# Patient Record
Sex: FEMALE | Race: WHITE | NOT HISPANIC OR LATINO | Employment: OTHER | URBAN - METROPOLITAN AREA
[De-identification: names, ages, dates, MRNs, and addresses within clinical notes are randomized per-mention and may not be internally consistent; named-entity substitution may affect disease eponyms.]

---

## 2023-03-04 LAB
ALANINE AMINOTRANSFERASE (SGPT) (U/L) IN SER/PLAS: 24 U/L (ref 7–45)
ALBUMIN (G/DL) IN SER/PLAS: 3.8 G/DL (ref 3.4–5)
ALKALINE PHOSPHATASE (U/L) IN SER/PLAS: 104 U/L (ref 33–136)
ANION GAP IN SER/PLAS: 12 MMOL/L (ref 10–20)
ASPARTATE AMINOTRANSFERASE (SGOT) (U/L) IN SER/PLAS: 36 U/L (ref 9–39)
BILIRUBIN TOTAL (MG/DL) IN SER/PLAS: 0.6 MG/DL (ref 0–1.2)
CALCIDIOL (25 OH VITAMIN D3) (NG/ML) IN SER/PLAS: 40 NG/ML
CALCIUM (MG/DL) IN SER/PLAS: 9.5 MG/DL (ref 8.6–10.6)
CARBON DIOXIDE, TOTAL (MMOL/L) IN SER/PLAS: 25 MMOL/L (ref 21–32)
CHLORIDE (MMOL/L) IN SER/PLAS: 105 MMOL/L (ref 98–107)
CREATININE (MG/DL) IN SER/PLAS: 0.72 MG/DL (ref 0.5–1.05)
GFR FEMALE: 89 ML/MIN/1.73M2
GLUCOSE (MG/DL) IN SER/PLAS: 81 MG/DL (ref 74–99)
POTASSIUM (MMOL/L) IN SER/PLAS: 6 MMOL/L (ref 3.5–5.3)
PROTEIN TOTAL: 6.8 G/DL (ref 6.4–8.2)
SODIUM (MMOL/L) IN SER/PLAS: 136 MMOL/L (ref 136–145)
THYROTROPIN (MIU/L) IN SER/PLAS BY DETECTION LIMIT <= 0.05 MIU/L: 1.45 MIU/L (ref 0.44–3.98)
UREA NITROGEN (MG/DL) IN SER/PLAS: 19 MG/DL (ref 6–23)

## 2023-03-07 DIAGNOSIS — E87.5 HYPERKALEMIA: Primary | ICD-10-CM

## 2023-03-11 ENCOUNTER — LAB (OUTPATIENT)
Dept: LAB | Facility: LAB | Age: 73
End: 2023-03-11
Payer: MEDICARE

## 2023-03-11 DIAGNOSIS — E87.5 HYPERKALEMIA: ICD-10-CM

## 2023-03-11 LAB — POTASSIUM (MMOL/L) IN SER/PLAS: 4 MMOL/L (ref 3.5–5.3)

## 2023-03-11 PROCEDURE — 84132 ASSAY OF SERUM POTASSIUM: CPT

## 2023-03-11 PROCEDURE — 36415 COLL VENOUS BLD VENIPUNCTURE: CPT

## 2023-04-27 DIAGNOSIS — K21.00 GASTROESOPHAGEAL REFLUX DISEASE WITH ESOPHAGITIS WITHOUT HEMORRHAGE: Primary | ICD-10-CM

## 2023-04-27 PROBLEM — M41.9 SCOLIOSIS: Status: ACTIVE | Noted: 2023-04-27

## 2023-04-27 PROBLEM — R10.11 ABDOMINAL PAIN, RUQ (RIGHT UPPER QUADRANT): Status: RESOLVED | Noted: 2023-04-27 | Resolved: 2023-04-27

## 2023-04-27 PROBLEM — E66.9 OBESITY (BMI 35.0-39.9 WITHOUT COMORBIDITY): Status: ACTIVE | Noted: 2023-04-27

## 2023-04-27 PROBLEM — M70.60 GREATER TROCHANTERIC BURSITIS: Status: ACTIVE | Noted: 2023-04-27

## 2023-04-27 PROBLEM — R09.81 SINUS CONGESTION: Status: RESOLVED | Noted: 2023-04-27 | Resolved: 2023-04-27

## 2023-04-27 PROBLEM — R07.89 ATYPICAL CHEST PAIN: Status: ACTIVE | Noted: 2023-04-27

## 2023-04-27 PROBLEM — E55.9 VITAMIN D DEFICIENCY: Status: ACTIVE | Noted: 2023-04-27

## 2023-04-27 PROBLEM — I10 ELEVATED BLOOD PRESSURE READING IN OFFICE WITH DIAGNOSIS OF HYPERTENSION: Status: ACTIVE | Noted: 2023-04-27

## 2023-04-27 PROBLEM — I87.2 VENOUS INSUFFICIENCY (CHRONIC) (PERIPHERAL): Status: ACTIVE | Noted: 2023-04-27

## 2023-04-27 PROBLEM — M54.16 CHRONIC LUMBAR RADICULOPATHY: Status: ACTIVE | Noted: 2023-04-27

## 2023-04-27 PROBLEM — M25.511 RIGHT SHOULDER PAIN: Status: ACTIVE | Noted: 2023-04-27

## 2023-04-27 PROBLEM — M54.50 LOWER BACK PAIN: Status: ACTIVE | Noted: 2023-04-27

## 2023-04-27 PROBLEM — K29.00 ACUTE GASTRITIS WITHOUT HEMORRHAGE: Status: ACTIVE | Noted: 2023-04-27

## 2023-04-27 PROBLEM — E03.9 HYPOTHYROIDISM: Status: ACTIVE | Noted: 2023-04-27

## 2023-04-27 PROBLEM — R60.0 LEG EDEMA: Status: ACTIVE | Noted: 2023-04-27

## 2023-04-27 PROBLEM — M75.81 RIGHT ROTATOR CUFF TENDINITIS: Status: ACTIVE | Noted: 2023-04-27

## 2023-04-27 PROBLEM — K21.9 ESOPHAGEAL REFLUX: Status: ACTIVE | Noted: 2023-04-27

## 2023-04-27 PROBLEM — H81.10 BENIGN PAROXYSMAL POSITIONAL VERTIGO: Status: ACTIVE | Noted: 2023-04-27

## 2023-04-27 RX ORDER — ESOMEPRAZOLE MAGNESIUM 40 MG/1
40 CAPSULE, DELAYED RELEASE ORAL
COMMUNITY
Start: 2022-07-13 | End: 2023-04-27 | Stop reason: SDUPTHER

## 2023-04-27 RX ORDER — ESOMEPRAZOLE MAGNESIUM 40 MG/1
40 CAPSULE, DELAYED RELEASE ORAL
Qty: 90 CAPSULE | Refills: 0 | Status: SHIPPED | OUTPATIENT
Start: 2023-04-27 | End: 2023-08-01 | Stop reason: SDUPTHER

## 2023-05-08 DIAGNOSIS — E03.9 HYPOTHYROIDISM, UNSPECIFIED TYPE: Primary | ICD-10-CM

## 2023-05-08 RX ORDER — LEVOTHYROXINE SODIUM 150 UG/1
150 TABLET ORAL DAILY
Qty: 90 TABLET | Refills: 1 | Status: SHIPPED | OUTPATIENT
Start: 2023-05-08 | End: 2023-11-18

## 2023-05-08 RX ORDER — LEVOTHYROXINE SODIUM 150 UG/1
150 TABLET ORAL DAILY
COMMUNITY
End: 2023-05-08 | Stop reason: SDUPTHER

## 2023-08-01 DIAGNOSIS — K21.00 GASTROESOPHAGEAL REFLUX DISEASE WITH ESOPHAGITIS WITHOUT HEMORRHAGE: ICD-10-CM

## 2023-08-01 RX ORDER — ESOMEPRAZOLE MAGNESIUM 40 MG/1
40 CAPSULE, DELAYED RELEASE ORAL
Qty: 90 CAPSULE | Refills: 0 | Status: SHIPPED | OUTPATIENT
Start: 2023-08-01 | End: 2023-08-02 | Stop reason: SDUPTHER

## 2023-08-02 DIAGNOSIS — K21.00 GASTROESOPHAGEAL REFLUX DISEASE WITH ESOPHAGITIS WITHOUT HEMORRHAGE: ICD-10-CM

## 2023-08-02 RX ORDER — ESOMEPRAZOLE MAGNESIUM 40 MG/1
40 CAPSULE, DELAYED RELEASE ORAL
Qty: 90 CAPSULE | Refills: 0 | Status: SHIPPED | OUTPATIENT
Start: 2023-08-02 | End: 2023-08-31 | Stop reason: ALTCHOICE

## 2023-08-17 ENCOUNTER — APPOINTMENT (OUTPATIENT)
Dept: PRIMARY CARE | Facility: CLINIC | Age: 73
End: 2023-08-17
Payer: MEDICARE

## 2023-08-17 NOTE — PROGRESS NOTES
Subjective   Patient ID: Suri Wray is a 73 y.o. female who presents for No chief complaint on file..  HPI    Review of Systems    Objective   There were no vitals taken for this visit.    Physical Exam      Assessment/Plan   Problem List Items Addressed This Visit    None

## 2023-08-31 ENCOUNTER — LAB (OUTPATIENT)
Dept: LAB | Facility: LAB | Age: 73
End: 2023-08-31
Payer: MEDICARE

## 2023-08-31 ENCOUNTER — OFFICE VISIT (OUTPATIENT)
Dept: PRIMARY CARE | Facility: CLINIC | Age: 73
End: 2023-08-31
Payer: MEDICARE

## 2023-08-31 VITALS
HEART RATE: 77 BPM | WEIGHT: 178.6 LBS | DIASTOLIC BLOOD PRESSURE: 82 MMHG | OXYGEN SATURATION: 99 % | SYSTOLIC BLOOD PRESSURE: 134 MMHG | BODY MASS INDEX: 36.07 KG/M2 | TEMPERATURE: 97.9 F

## 2023-08-31 DIAGNOSIS — R63.4 WEIGHT LOSS: ICD-10-CM

## 2023-08-31 DIAGNOSIS — E66.01 OBESITY, MORBID (MULTI): ICD-10-CM

## 2023-08-31 DIAGNOSIS — R10.13 EPIGASTRIC PAIN: Primary | ICD-10-CM

## 2023-08-31 DIAGNOSIS — R10.13 EPIGASTRIC PAIN: ICD-10-CM

## 2023-08-31 DIAGNOSIS — E66.9 CLASS 2 OBESITY WITHOUT SERIOUS COMORBIDITY WITH BODY MASS INDEX (BMI) OF 36.0 TO 36.9 IN ADULT, UNSPECIFIED OBESITY TYPE: ICD-10-CM

## 2023-08-31 LAB
ALANINE AMINOTRANSFERASE (SGPT) (U/L) IN SER/PLAS: 12 U/L (ref 7–45)
ALBUMIN (G/DL) IN SER/PLAS: 3.8 G/DL (ref 3.4–5)
ALKALINE PHOSPHATASE (U/L) IN SER/PLAS: 101 U/L (ref 33–136)
ANION GAP IN SER/PLAS: 12 MMOL/L (ref 10–20)
ASPARTATE AMINOTRANSFERASE (SGOT) (U/L) IN SER/PLAS: 15 U/L (ref 9–39)
BILIRUBIN TOTAL (MG/DL) IN SER/PLAS: 0.3 MG/DL (ref 0–1.2)
CALCIUM (MG/DL) IN SER/PLAS: 9.6 MG/DL (ref 8.6–10.6)
CARBON DIOXIDE, TOTAL (MMOL/L) IN SER/PLAS: 26 MMOL/L (ref 21–32)
CHLORIDE (MMOL/L) IN SER/PLAS: 104 MMOL/L (ref 98–107)
CREATININE (MG/DL) IN SER/PLAS: 0.84 MG/DL (ref 0.5–1.05)
ERYTHROCYTE DISTRIBUTION WIDTH (RATIO) BY AUTOMATED COUNT: 13.7 % (ref 11.5–14.5)
ERYTHROCYTE MEAN CORPUSCULAR HEMOGLOBIN CONCENTRATION (G/DL) BY AUTOMATED: 31.4 G/DL (ref 32–36)
ERYTHROCYTE MEAN CORPUSCULAR VOLUME (FL) BY AUTOMATED COUNT: 97 FL (ref 80–100)
ERYTHROCYTES (10*6/UL) IN BLOOD BY AUTOMATED COUNT: 4.16 X10E12/L (ref 4–5.2)
GFR FEMALE: 74 ML/MIN/1.73M2
GLUCOSE (MG/DL) IN SER/PLAS: 115 MG/DL (ref 74–99)
HEMATOCRIT (%) IN BLOOD BY AUTOMATED COUNT: 40.5 % (ref 36–46)
HEMOGLOBIN (G/DL) IN BLOOD: 12.7 G/DL (ref 12–16)
LEUKOCYTES (10*3/UL) IN BLOOD BY AUTOMATED COUNT: 9.5 X10E9/L (ref 4.4–11.3)
NRBC (PER 100 WBCS) BY AUTOMATED COUNT: 0 /100 WBC (ref 0–0)
PLATELETS (10*3/UL) IN BLOOD AUTOMATED COUNT: 347 X10E9/L (ref 150–450)
POTASSIUM (MMOL/L) IN SER/PLAS: 4.7 MMOL/L (ref 3.5–5.3)
PROTEIN TOTAL: 6.8 G/DL (ref 6.4–8.2)
SODIUM (MMOL/L) IN SER/PLAS: 137 MMOL/L (ref 136–145)
THYROTROPIN (MIU/L) IN SER/PLAS BY DETECTION LIMIT <= 0.05 MIU/L: 0.1 MIU/L (ref 0.44–3.98)
THYROXINE (T4) FREE (NG/DL) IN SER/PLAS: 1.69 NG/DL (ref 0.78–1.48)
UREA NITROGEN (MG/DL) IN SER/PLAS: 18 MG/DL (ref 6–23)

## 2023-08-31 PROCEDURE — 1036F TOBACCO NON-USER: CPT | Performed by: FAMILY MEDICINE

## 2023-08-31 PROCEDURE — 36415 COLL VENOUS BLD VENIPUNCTURE: CPT

## 2023-08-31 PROCEDURE — 85027 COMPLETE CBC AUTOMATED: CPT

## 2023-08-31 PROCEDURE — 3008F BODY MASS INDEX DOCD: CPT | Performed by: FAMILY MEDICINE

## 2023-08-31 PROCEDURE — 3075F SYST BP GE 130 - 139MM HG: CPT | Performed by: FAMILY MEDICINE

## 2023-08-31 PROCEDURE — 3079F DIAST BP 80-89 MM HG: CPT | Performed by: FAMILY MEDICINE

## 2023-08-31 PROCEDURE — 84443 ASSAY THYROID STIM HORMONE: CPT

## 2023-08-31 PROCEDURE — 1160F RVW MEDS BY RX/DR IN RCRD: CPT | Performed by: FAMILY MEDICINE

## 2023-08-31 PROCEDURE — 99213 OFFICE O/P EST LOW 20 MIN: CPT | Performed by: FAMILY MEDICINE

## 2023-08-31 PROCEDURE — 1126F AMNT PAIN NOTED NONE PRSNT: CPT | Performed by: FAMILY MEDICINE

## 2023-08-31 PROCEDURE — 84439 ASSAY OF FREE THYROXINE: CPT

## 2023-08-31 PROCEDURE — 80053 COMPREHEN METABOLIC PANEL: CPT

## 2023-08-31 PROCEDURE — 1159F MED LIST DOCD IN RCRD: CPT | Performed by: FAMILY MEDICINE

## 2023-08-31 RX ORDER — DICYCLOMINE HYDROCHLORIDE 20 MG/1
TABLET ORAL
COMMUNITY
Start: 2023-04-01 | End: 2023-10-17 | Stop reason: SDUPTHER

## 2023-08-31 RX ORDER — OMEPRAZOLE 40 MG/1
40 CAPSULE, DELAYED RELEASE ORAL DAILY
COMMUNITY
End: 2023-10-04 | Stop reason: ALTCHOICE

## 2023-08-31 ASSESSMENT — PATIENT HEALTH QUESTIONNAIRE - PHQ9
SUM OF ALL RESPONSES TO PHQ9 QUESTIONS 1 AND 2: 0
1. LITTLE INTEREST OR PLEASURE IN DOING THINGS: NOT AT ALL
2. FEELING DOWN, DEPRESSED OR HOPELESS: NOT AT ALL

## 2023-08-31 NOTE — PROGRESS NOTES
Subjective   Patient ID: Suri Wray is a 73 y.o. female who presents for Abdominal Pain (Been happening since April 2023 /Urgent care April 1st - was prescribed Dicyclomine X7 days/Was sent for US on May 8th of abdominal).  Patient has been dealing with epigastric and RUQ pain that started last year.  GE endoscopy last year, March 2022 showed:  Impression:          - Low-grade of narrowing Schatzki ring. Dilated.                       - Normal mucosa was found in the entire esophagus.                       - Large hiatal hernia.                       - Erythematous mucosa in the gastric body and antrum.                       Biopsied.                       - Normal examined duodenum.  Pain began again this year.  Saw GE CNP at [facility]  Advised nexium  40 mg at bedtime, and pepcid as needed.  RUQ ultrasound was normal.  Has also had Dicyclomine  Pain in AM when  wakes, and after eating.  Is able to eat oatmeal without pain.    Eating less since having the problems.  Losing  weight, but wants to  make sure it is not from some other reason.  Had mammogram and pap recently.  Colonoscopy done in 2020, to be repeated in 2025.         Review of Systems    Objective   /82 (BP Location: Left arm, Patient Position: Sitting)   Pulse 77   Temp 36.6 °C (97.9 °F) (Oral)   Wt 81 kg (178 lb 9.6 oz)   SpO2 99%   BMI 36.07 kg/m²     Physical Exam  Vitals reviewed.   Constitutional:       Appearance: Normal appearance.   Cardiovascular:      Rate and Rhythm: Normal rate and regular rhythm.      Heart sounds: No murmur heard.  Pulmonary:      Effort: Pulmonary effort is normal.      Breath sounds: Normal breath sounds.   Abdominal:      General: There is no distension.      Palpations: Abdomen is soft. There is no mass.      Tenderness: There is abdominal tenderness (mild RUQ).   Musculoskeletal:      Right lower leg: No edema.      Left lower leg: No edema.   Neurological:      Mental Status: She is alert.            Assessment/Plan   Problem List Items Addressed This Visit       Obesity, morbid (CMS/HCC)     Other Visit Diagnoses       Epigastric pain    -  Primary    Relevant Orders    Comprehensive Metabolic Panel    CBC    Weight loss        Relevant Orders    TSH with reflex to Free T4 if abnormal    Class 2 obesity without serious comorbidity with body mass index (BMI) of 36.0 to 36.9 in adult, unspecified obesity type

## 2023-08-31 NOTE — PATIENT INSTRUCTIONS
Epigastric and right upper abdominal pain, with history of gastritis, hiatal hernia, and mild schatzki's ring.  Continue Nexium 40 mg at night, and pepcid as well.  Can use dicyclomine as needed.  Will check non-fasting blood work.  Follow up with the gastroenterologist, since symptoms have persisted.

## 2023-09-01 DIAGNOSIS — E03.9 HYPOTHYROIDISM, UNSPECIFIED TYPE: ICD-10-CM

## 2023-10-03 DIAGNOSIS — K21.9 GASTROESOPHAGEAL REFLUX DISEASE, UNSPECIFIED WHETHER ESOPHAGITIS PRESENT: Primary | ICD-10-CM

## 2023-10-04 RX ORDER — ESOMEPRAZOLE MAGNESIUM 40 MG/1
40 CAPSULE, DELAYED RELEASE ORAL
Qty: 90 CAPSULE | Refills: 1 | Status: SHIPPED | OUTPATIENT
Start: 2023-10-04 | End: 2023-10-30 | Stop reason: SDUPTHER

## 2023-10-17 DIAGNOSIS — R10.9 ABDOMINAL PAIN, UNSPECIFIED ABDOMINAL LOCATION: Primary | ICD-10-CM

## 2023-10-17 RX ORDER — DICYCLOMINE HYDROCHLORIDE 20 MG/1
TABLET ORAL
Qty: 60 TABLET | Refills: 0 | Status: SHIPPED | OUTPATIENT
Start: 2023-10-17 | End: 2023-10-25

## 2023-10-18 DIAGNOSIS — E03.9 HYPOTHYROIDISM, UNSPECIFIED TYPE: Primary | ICD-10-CM

## 2023-10-23 ENCOUNTER — LAB (OUTPATIENT)
Dept: LAB | Facility: LAB | Age: 73
End: 2023-10-23
Payer: MEDICARE

## 2023-10-23 DIAGNOSIS — E03.9 HYPOTHYROIDISM, UNSPECIFIED TYPE: ICD-10-CM

## 2023-10-23 LAB — TSH SERPL-ACNC: 0.71 MIU/L (ref 0.44–3.98)

## 2023-10-23 PROCEDURE — 36415 COLL VENOUS BLD VENIPUNCTURE: CPT

## 2023-10-23 PROCEDURE — 84443 ASSAY THYROID STIM HORMONE: CPT

## 2023-10-25 DIAGNOSIS — R10.9 ABDOMINAL PAIN, UNSPECIFIED ABDOMINAL LOCATION: ICD-10-CM

## 2023-10-25 RX ORDER — DICYCLOMINE HYDROCHLORIDE 20 MG/1
TABLET ORAL
Qty: 360 TABLET | Refills: 0 | Status: SHIPPED | OUTPATIENT
Start: 2023-10-25 | End: 2024-02-07 | Stop reason: SDUPTHER

## 2023-10-30 ENCOUNTER — TELEPHONE (OUTPATIENT)
Dept: PRIMARY CARE | Facility: CLINIC | Age: 73
End: 2023-10-30
Payer: MEDICARE

## 2023-10-30 DIAGNOSIS — K21.9 GASTROESOPHAGEAL REFLUX DISEASE, UNSPECIFIED WHETHER ESOPHAGITIS PRESENT: ICD-10-CM

## 2023-10-30 RX ORDER — ESOMEPRAZOLE MAGNESIUM 40 MG/1
40 CAPSULE, DELAYED RELEASE ORAL
Qty: 90 CAPSULE | Refills: 1 | Status: SHIPPED | OUTPATIENT
Start: 2023-10-30 | End: 2024-02-06 | Stop reason: SDUPTHER

## 2023-11-18 DIAGNOSIS — E03.9 HYPOTHYROIDISM, UNSPECIFIED TYPE: ICD-10-CM

## 2023-11-18 RX ORDER — LEVOTHYROXINE SODIUM 150 UG/1
150 TABLET ORAL DAILY
Qty: 90 TABLET | Refills: 1 | Status: SHIPPED | OUTPATIENT
Start: 2023-11-18 | End: 2024-05-16

## 2024-01-02 ENCOUNTER — APPOINTMENT (OUTPATIENT)
Dept: GASTROENTEROLOGY | Facility: CLINIC | Age: 74
End: 2024-01-02
Payer: MEDICARE

## 2024-02-06 ENCOUNTER — OFFICE VISIT (OUTPATIENT)
Dept: GASTROENTEROLOGY | Facility: CLINIC | Age: 74
End: 2024-02-06
Payer: MEDICARE

## 2024-02-06 VITALS
WEIGHT: 174 LBS | TEMPERATURE: 97.5 F | SYSTOLIC BLOOD PRESSURE: 169 MMHG | HEIGHT: 59 IN | DIASTOLIC BLOOD PRESSURE: 93 MMHG | BODY MASS INDEX: 35.08 KG/M2 | HEART RATE: 85 BPM

## 2024-02-06 DIAGNOSIS — K21.9 GASTROESOPHAGEAL REFLUX DISEASE WITHOUT ESOPHAGITIS: Primary | ICD-10-CM

## 2024-02-06 DIAGNOSIS — K21.9 GASTROESOPHAGEAL REFLUX DISEASE, UNSPECIFIED WHETHER ESOPHAGITIS PRESENT: ICD-10-CM

## 2024-02-06 PROCEDURE — 3080F DIAST BP >= 90 MM HG: CPT | Performed by: INTERNAL MEDICINE

## 2024-02-06 PROCEDURE — 1159F MED LIST DOCD IN RCRD: CPT | Performed by: INTERNAL MEDICINE

## 2024-02-06 PROCEDURE — 99203 OFFICE O/P NEW LOW 30 MIN: CPT | Performed by: INTERNAL MEDICINE

## 2024-02-06 PROCEDURE — 3077F SYST BP >= 140 MM HG: CPT | Performed by: INTERNAL MEDICINE

## 2024-02-06 PROCEDURE — 1126F AMNT PAIN NOTED NONE PRSNT: CPT | Performed by: INTERNAL MEDICINE

## 2024-02-06 PROCEDURE — 3008F BODY MASS INDEX DOCD: CPT | Performed by: INTERNAL MEDICINE

## 2024-02-06 PROCEDURE — 1036F TOBACCO NON-USER: CPT | Performed by: INTERNAL MEDICINE

## 2024-02-06 PROCEDURE — 99213 OFFICE O/P EST LOW 20 MIN: CPT | Mod: 25 | Performed by: INTERNAL MEDICINE

## 2024-02-06 RX ORDER — ESOMEPRAZOLE MAGNESIUM 40 MG/1
40 CAPSULE, DELAYED RELEASE ORAL
Qty: 90 CAPSULE | Refills: 3 | Status: SHIPPED | OUTPATIENT
Start: 2024-02-06 | End: 2025-02-05

## 2024-02-06 NOTE — PROGRESS NOTES
"Subjective   Patient ID: Suri Wray is a 73 y.o. female who presents for New Patient Visit.  She is a new patient to discuss reflux and abdominal pain in the epigastrium that radiates to the right side     Known to have hiatal hernia and esophageal ring dilated in 2022 by Dr. Lemons   She had her colonoscopy updated the same time  Recently broke through Nexium 20 mg per day and increased to 40 mg per day    US done last year May 2023 at [facility] showed normal liver, cholecystectomy and narrow bile duct at 4 mm  Pancreas obscured by bowel gas    Pain treated with dicyclomine and it \"helps a lot\"    Weigh is down due to eating at home   Good appetite   Bowel movements \"ok\" but may be lactose intolerant   Eating oatmeal every morning    GB removed 33 years ago due to many stones   Not sure if symptoms are similar           Review of Systems    Objective   Physical Exam  Constitutional:       Appearance: Normal appearance. She is obese.   Pulmonary:      Effort: Pulmonary effort is normal.   Abdominal:      General: There is no distension.      Palpations: Abdomen is soft. There is no mass.      Tenderness: There is no abdominal tenderness. There is no guarding.      Hernia: No hernia is present.   Neurological:      Mental Status: She is alert.         Assessment/Plan   Problem List Items Addressed This Visit             ICD-10-CM    Esophageal reflux - Primary K21.9            Tho Macias, DO 02/06/24 11:40 AM   "

## 2024-02-06 NOTE — PATIENT INSTRUCTIONS
I recommend you continue the Nexium once a day on an empty stomach.  The lower dose may be fine but don't worry about using 40 mg.      There is a slight risk of stomach cancer due to the prior changes on Dr. Lemons's biopsies (intestinal metaplasia) so having an endoscopy again in 2025 - 2027 would be reasonable.

## 2024-02-07 DIAGNOSIS — R10.9 ABDOMINAL PAIN, UNSPECIFIED ABDOMINAL LOCATION: ICD-10-CM

## 2024-02-07 RX ORDER — DICYCLOMINE HYDROCHLORIDE 20 MG/1
20 TABLET ORAL 4 TIMES DAILY PRN
Qty: 120 TABLET | Refills: 11 | Status: SHIPPED | OUTPATIENT
Start: 2024-02-07 | End: 2025-02-06

## 2024-02-28 ENCOUNTER — OFFICE VISIT (OUTPATIENT)
Dept: PRIMARY CARE | Facility: CLINIC | Age: 74
End: 2024-02-28
Payer: MEDICARE

## 2024-02-28 VITALS
TEMPERATURE: 97.8 F | OXYGEN SATURATION: 98 % | HEIGHT: 59 IN | SYSTOLIC BLOOD PRESSURE: 159 MMHG | BODY MASS INDEX: 35.44 KG/M2 | WEIGHT: 175.8 LBS | DIASTOLIC BLOOD PRESSURE: 82 MMHG | HEART RATE: 66 BPM

## 2024-02-28 DIAGNOSIS — E78.00 ELEVATED CHOLESTEROL: ICD-10-CM

## 2024-02-28 DIAGNOSIS — E66.9 OBESITY WITH BODY MASS INDEX (BMI) OF 35.0 TO 39.9 WITHOUT COMORBIDITY: ICD-10-CM

## 2024-02-28 DIAGNOSIS — R10.13 EPIGASTRIC PAIN: Primary | ICD-10-CM

## 2024-02-28 DIAGNOSIS — E03.9 HYPOTHYROIDISM, UNSPECIFIED TYPE: ICD-10-CM

## 2024-02-28 PROCEDURE — 1160F RVW MEDS BY RX/DR IN RCRD: CPT | Performed by: FAMILY MEDICINE

## 2024-02-28 PROCEDURE — 3077F SYST BP >= 140 MM HG: CPT | Performed by: FAMILY MEDICINE

## 2024-02-28 PROCEDURE — 3008F BODY MASS INDEX DOCD: CPT | Performed by: FAMILY MEDICINE

## 2024-02-28 PROCEDURE — 99213 OFFICE O/P EST LOW 20 MIN: CPT | Performed by: FAMILY MEDICINE

## 2024-02-28 PROCEDURE — 3079F DIAST BP 80-89 MM HG: CPT | Performed by: FAMILY MEDICINE

## 2024-02-28 PROCEDURE — 1126F AMNT PAIN NOTED NONE PRSNT: CPT | Performed by: FAMILY MEDICINE

## 2024-02-28 PROCEDURE — 1036F TOBACCO NON-USER: CPT | Performed by: FAMILY MEDICINE

## 2024-02-28 PROCEDURE — 1159F MED LIST DOCD IN RCRD: CPT | Performed by: FAMILY MEDICINE

## 2024-02-28 ASSESSMENT — ANXIETY QUESTIONNAIRES
4. TROUBLE RELAXING: NOT AT ALL
5. BEING SO RESTLESS THAT IT IS HARD TO SIT STILL: NOT AT ALL
1. FEELING NERVOUS, ANXIOUS, OR ON EDGE: NOT AT ALL
3. WORRYING TOO MUCH ABOUT DIFFERENT THINGS: NOT AT ALL
7. FEELING AFRAID AS IF SOMETHING AWFUL MIGHT HAPPEN: NOT AT ALL
6. BECOMING EASILY ANNOYED OR IRRITABLE: NOT AT ALL
GAD7 TOTAL SCORE: 1
2. NOT BEING ABLE TO STOP OR CONTROL WORRYING: SEVERAL DAYS
IF YOU CHECKED OFF ANY PROBLEMS ON THIS QUESTIONNAIRE, HOW DIFFICULT HAVE THESE PROBLEMS MADE IT FOR YOU TO DO YOUR WORK, TAKE CARE OF THINGS AT HOME, OR GET ALONG WITH OTHER PEOPLE: SOMEWHAT DIFFICULT

## 2024-02-28 ASSESSMENT — PATIENT HEALTH QUESTIONNAIRE - PHQ9
1. LITTLE INTEREST OR PLEASURE IN DOING THINGS: NOT AT ALL
2. FEELING DOWN, DEPRESSED OR HOPELESS: NOT AT ALL
SUM OF ALL RESPONSES TO PHQ9 QUESTIONS 1 AND 2: 0

## 2024-02-28 ASSESSMENT — ENCOUNTER SYMPTOMS
OCCASIONAL FEELINGS OF UNSTEADINESS: 0
LOSS OF SENSATION IN FEET: 0
DEPRESSION: 0

## 2024-02-28 NOTE — PROGRESS NOTES
"kSubjective   Patient ID: Suri Wray is a 73 y.o. female who presents for Abdominal Pain.  Pain on and  off in epigastrium, with radiation to the right.  Dicyclomine helps some.  Works best if takes prior to eating.  Gets diarrhea off and on, but hasn't changed.  May be related to lactose intolerance.    She saw Dr. Macias yesterday for the first time.   Previously had seen Dr. Lemons.  Copied from Dr. Macias note:  \"Known to have hiatal hernia and esophageal ring dilated in 2022 by Dr. Lemons   She had her colonoscopy updated the same time  Recently broke through Nexium 20 mg per day and increased to 40 mg per day   US done last year May 2023 at [facility] showed normal liver, cholecystectomy and narrow bile duct at 4 mm  Pancreas obscured by bowel gas\"     He had recommended continue nexium, and to follow up with Dr. Lemons with repeat EGD in 4379-2235.            Review of Systems    Objective   /82   Pulse 66   Temp 36.6 °C (97.8 °F)   Ht 1.499 m (4' 11\")   Wt 79.7 kg (175 lb 12.8 oz)   SpO2 98%   BMI 35.51 kg/m²     Physical Exam  Vitals reviewed.   Constitutional:       Appearance: Normal appearance.   Cardiovascular:      Rate and Rhythm: Normal rate and regular rhythm.   Pulmonary:      Effort: Pulmonary effort is normal.      Breath sounds: Normal breath sounds. Rales: epigastrium mainly.   Abdominal:      General: There is no distension.      Palpations: Abdomen is soft.      Tenderness: There is abdominal tenderness. There is no guarding or rebound.   Musculoskeletal:      Right lower leg: No edema.      Left lower leg: No edema.   Neurological:      Mental Status: She is alert.           Assessment/Plan   Problem List Items Addressed This Visit       Hypothyroidism    Relevant Orders    TSH with reflex to Free T4 if abnormal     Other Visit Diagnoses       Epigastric pain    -  Primary    Relevant Orders    Comprehensive Metabolic Panel    CBC    Lipase    Elevated cholesterol        " Relevant Orders    Lipid Panel    Obesity with body mass index (BMI) of 35.0 to 39.9 without comorbidity

## 2024-02-29 PROBLEM — E66.01 OBESITY, MORBID (MULTI): Status: RESOLVED | Noted: 2023-08-31 | Resolved: 2024-02-29

## 2024-02-29 NOTE — PATIENT INSTRUCTIONS
Increased epigastrium pain with radiation to the right upper quadrant.  Continue Nexium 40 mg in AM.  Refilled dicyclomine to use as needed for abdominal pain related to eating.  (Can take it prior to eating, to avoid getting the pain).  Check blood work for CBC, CMP.  Discussed following up with Dr. Lemons.  EGD done previously showed some metaplasia, which can be a precancerous change.  It should be rechecked in 8737-1536, but he may want to do it sooner, due to increased symptoms,    Will also check thyroid level and adjust dose of medication if needed.  Currently taking levothyroxine 150 mcg six days a week.

## 2024-03-04 ENCOUNTER — LAB (OUTPATIENT)
Dept: LAB | Facility: LAB | Age: 74
End: 2024-03-04
Payer: MEDICARE

## 2024-03-04 DIAGNOSIS — E78.00 ELEVATED CHOLESTEROL: ICD-10-CM

## 2024-03-04 DIAGNOSIS — R10.13 EPIGASTRIC PAIN: ICD-10-CM

## 2024-03-04 DIAGNOSIS — E03.9 HYPOTHYROIDISM, UNSPECIFIED TYPE: ICD-10-CM

## 2024-03-04 LAB
ALBUMIN SERPL BCP-MCNC: 3.7 G/DL (ref 3.4–5)
ALP SERPL-CCNC: 98 U/L (ref 33–136)
ALT SERPL W P-5'-P-CCNC: 13 U/L (ref 7–45)
ANION GAP SERPL CALC-SCNC: 13 MMOL/L (ref 10–20)
AST SERPL W P-5'-P-CCNC: 14 U/L (ref 9–39)
BILIRUB SERPL-MCNC: 0.4 MG/DL (ref 0–1.2)
BUN SERPL-MCNC: 17 MG/DL (ref 6–23)
CALCIUM SERPL-MCNC: 9.6 MG/DL (ref 8.6–10.6)
CHLORIDE SERPL-SCNC: 105 MMOL/L (ref 98–107)
CHOLEST SERPL-MCNC: 235 MG/DL (ref 0–199)
CHOLESTEROL/HDL RATIO: 4.6
CO2 SERPL-SCNC: 26 MMOL/L (ref 21–32)
CREAT SERPL-MCNC: 0.8 MG/DL (ref 0.5–1.05)
EGFRCR SERPLBLD CKD-EPI 2021: 78 ML/MIN/1.73M*2
ERYTHROCYTE [DISTWIDTH] IN BLOOD BY AUTOMATED COUNT: 13.5 % (ref 11.5–14.5)
GLUCOSE SERPL-MCNC: 84 MG/DL (ref 74–99)
HCT VFR BLD AUTO: 38.7 % (ref 36–46)
HDLC SERPL-MCNC: 51.3 MG/DL
HGB BLD-MCNC: 12.6 G/DL (ref 12–16)
LDLC SERPL CALC-MCNC: 143 MG/DL
LIPASE SERPL-CCNC: 14 U/L (ref 9–82)
MCH RBC QN AUTO: 30.6 PG (ref 26–34)
MCHC RBC AUTO-ENTMCNC: 32.6 G/DL (ref 32–36)
MCV RBC AUTO: 94 FL (ref 80–100)
NON HDL CHOLESTEROL: 184 MG/DL (ref 0–149)
NRBC BLD-RTO: 0 /100 WBCS (ref 0–0)
PLATELET # BLD AUTO: 329 X10*3/UL (ref 150–450)
POTASSIUM SERPL-SCNC: 4.5 MMOL/L (ref 3.5–5.3)
PROT SERPL-MCNC: 6.6 G/DL (ref 6.4–8.2)
RBC # BLD AUTO: 4.12 X10*6/UL (ref 4–5.2)
SODIUM SERPL-SCNC: 139 MMOL/L (ref 136–145)
TRIGL SERPL-MCNC: 204 MG/DL (ref 0–149)
TSH SERPL-ACNC: 0.93 MIU/L (ref 0.44–3.98)
VLDL: 41 MG/DL (ref 0–40)
WBC # BLD AUTO: 8.8 X10*3/UL (ref 4.4–11.3)

## 2024-03-04 PROCEDURE — 84443 ASSAY THYROID STIM HORMONE: CPT

## 2024-03-04 PROCEDURE — 80053 COMPREHEN METABOLIC PANEL: CPT

## 2024-03-04 PROCEDURE — 80061 LIPID PANEL: CPT

## 2024-03-04 PROCEDURE — 83690 ASSAY OF LIPASE: CPT

## 2024-03-04 PROCEDURE — 85027 COMPLETE CBC AUTOMATED: CPT

## 2024-03-04 PROCEDURE — 36415 COLL VENOUS BLD VENIPUNCTURE: CPT

## 2024-05-16 DIAGNOSIS — E03.9 HYPOTHYROIDISM, UNSPECIFIED TYPE: ICD-10-CM

## 2024-05-16 RX ORDER — LEVOTHYROXINE SODIUM 150 UG/1
150 TABLET ORAL DAILY
Qty: 90 TABLET | Refills: 1 | Status: SHIPPED | OUTPATIENT
Start: 2024-05-16

## 2024-06-27 ENCOUNTER — TELEPHONE (OUTPATIENT)
Dept: PRIMARY CARE | Facility: CLINIC | Age: 74
End: 2024-06-27
Payer: MEDICARE

## 2024-08-15 ENCOUNTER — OFFICE VISIT (OUTPATIENT)
Dept: PRIMARY CARE | Facility: CLINIC | Age: 74
End: 2024-08-15
Payer: MEDICARE

## 2024-08-15 VITALS
DIASTOLIC BLOOD PRESSURE: 68 MMHG | WEIGHT: 176.8 LBS | HEART RATE: 70 BPM | OXYGEN SATURATION: 98 % | SYSTOLIC BLOOD PRESSURE: 123 MMHG | HEIGHT: 59 IN | BODY MASS INDEX: 35.64 KG/M2

## 2024-08-15 DIAGNOSIS — M41.9 SCOLIOSIS OF THORACOLUMBAR SPINE, UNSPECIFIED SCOLIOSIS TYPE: ICD-10-CM

## 2024-08-15 DIAGNOSIS — M25.50 ARTHRALGIA, UNSPECIFIED JOINT: Primary | ICD-10-CM

## 2024-08-15 LAB — ERYTHROCYTE [SEDIMENTATION RATE] IN BLOOD BY WESTERGREN METHOD: 39 MM/H (ref 0–30)

## 2024-08-15 PROCEDURE — 99213 OFFICE O/P EST LOW 20 MIN: CPT | Performed by: FAMILY MEDICINE

## 2024-08-15 PROCEDURE — 85652 RBC SED RATE AUTOMATED: CPT

## 2024-08-15 PROCEDURE — 86038 ANTINUCLEAR ANTIBODIES: CPT

## 2024-08-15 PROCEDURE — 3008F BODY MASS INDEX DOCD: CPT | Performed by: FAMILY MEDICINE

## 2024-08-15 PROCEDURE — 1159F MED LIST DOCD IN RCRD: CPT | Performed by: FAMILY MEDICINE

## 2024-08-15 PROCEDURE — 1160F RVW MEDS BY RX/DR IN RCRD: CPT | Performed by: FAMILY MEDICINE

## 2024-08-15 PROCEDURE — 3074F SYST BP LT 130 MM HG: CPT | Performed by: FAMILY MEDICINE

## 2024-08-15 PROCEDURE — 3078F DIAST BP <80 MM HG: CPT | Performed by: FAMILY MEDICINE

## 2024-08-15 PROCEDURE — 86140 C-REACTIVE PROTEIN: CPT

## 2024-08-15 NOTE — PROGRESS NOTES
"Subjective   Patient ID: Suri Wray is a 74 y.o. female who presents for Joint Pain.  Patient here with concern and frustration about pain in multiple joints.  Has been treated for bursitis in hip and knee.  Did PT.  Neck pain, left side doing better.  Right  shoulder tendonitis.  Has had an injection, and going to have a second. Pain radiates to neck and down to fingers.  Is swimming to try and help, but can't use right arm for swimming.  Has scoliosis, is getting worse, ortho had recommended surgery.  Previously had left sciatica and had injections.  Last time was 2.5 years ago.  Wonders about fibromyalgia as a cause of her pain.  Her pain is more in joints, worse with rainy weather.        Review of Systems    Objective   /68   Pulse 70   Ht 1.499 m (4' 11\")   Wt 80.2 kg (176 lb 12.8 oz)   SpO2 98%   BMI 35.71 kg/m²     Physical Exam  Vitals reviewed.   Constitutional:       Appearance: Normal appearance.   Cardiovascular:      Rate and Rhythm: Normal rate and regular rhythm.      Heart sounds: No murmur heard.  Pulmonary:      Effort: Pulmonary effort is normal.      Breath sounds: Normal breath sounds.   Musculoskeletal:      Right lower leg: No edema.      Left lower leg: No edema.      Comments: Scoliosis of back involving thoracic and lumbar areas.  Tenderness right SI area.    Fibromyalgia trigger points not consistently tender.    Right shoulder with reduced ROM.  Tender anteriorly.       Neurological:      Mental Status: She is alert.           Assessment/Plan   Problem List Items Addressed This Visit       Scoliosis    Relevant Orders    XR thoracolumbar spine 2 views     Other Visit Diagnoses       Arthralgia, unspecified joint    -  Primary    Relevant Orders    Sedimentation Rate (Completed)    C-reactive protein (Completed)    JOE with Reflex to BERNARDA               "

## 2024-08-16 LAB — CRP SERPL-MCNC: 0.63 MG/DL

## 2024-08-16 NOTE — PATIENT INSTRUCTIONS
History of scoliosis, pain has been controlled to some extent with injections, PT.  Will check X-Ray to evaluate progression.  If worsening, or considering surgical intervention, will refer back to orthopedist.  (Previously seen by Dr. Ren and Dr. Garduno).    Other musculoskeletal pain from bursitis, tendonitis, arthritis, not really consistent with fibromyalgia, polymyalgia rheumatica, or auto-immune disorder.  Will check check JOE and inflammatory markers (ESR, CRP) to be thorough.   Continue with PT as needed, shoulder ortho, pain management.  Could consider medication such as gabapentin or duloxetine, which can sometimes be helpful with chronic pain.

## 2024-08-17 DIAGNOSIS — E03.9 HYPOTHYROIDISM, UNSPECIFIED TYPE: ICD-10-CM

## 2024-08-18 RX ORDER — LEVOTHYROXINE SODIUM 150 UG/1
150 TABLET ORAL DAILY
Qty: 90 TABLET | Refills: 1 | Status: SHIPPED | OUTPATIENT
Start: 2024-08-18

## 2024-08-21 LAB — ANA SER QL HEP2 SUBST: NEGATIVE

## 2024-08-26 ENCOUNTER — APPOINTMENT (OUTPATIENT)
Dept: PRIMARY CARE | Facility: CLINIC | Age: 74
End: 2024-08-26
Payer: MEDICARE

## 2024-09-05 ENCOUNTER — APPOINTMENT (OUTPATIENT)
Dept: PRIMARY CARE | Facility: CLINIC | Age: 74
End: 2024-09-05
Payer: MEDICARE

## 2024-10-19 ENCOUNTER — HOSPITAL ENCOUNTER (OUTPATIENT)
Dept: RADIOLOGY | Facility: CLINIC | Age: 74
Discharge: HOME | End: 2024-10-19
Payer: MEDICARE

## 2024-10-19 DIAGNOSIS — M41.9 SCOLIOSIS OF THORACOLUMBAR SPINE, UNSPECIFIED SCOLIOSIS TYPE: ICD-10-CM

## 2024-10-19 PROCEDURE — 72080 X-RAY EXAM THORACOLMB 2/> VW: CPT

## 2024-10-19 PROCEDURE — 72080 X-RAY EXAM THORACOLMB 2/> VW: CPT | Performed by: RADIOLOGY

## 2024-11-26 ENCOUNTER — HOSPITAL ENCOUNTER (OUTPATIENT)
Dept: RADIOLOGY | Facility: EXTERNAL LOCATION | Age: 74
Discharge: HOME | End: 2024-11-26

## 2024-11-26 ENCOUNTER — APPOINTMENT (OUTPATIENT)
Dept: PRIMARY CARE | Facility: CLINIC | Age: 74
End: 2024-11-26
Payer: MEDICARE

## 2024-11-26 VITALS
WEIGHT: 176.2 LBS | HEART RATE: 66 BPM | DIASTOLIC BLOOD PRESSURE: 76 MMHG | BODY MASS INDEX: 35.59 KG/M2 | OXYGEN SATURATION: 99 % | TEMPERATURE: 97.1 F | SYSTOLIC BLOOD PRESSURE: 126 MMHG

## 2024-11-26 DIAGNOSIS — Z79.899 LONG-TERM CURRENT USE OF PROTON PUMP INHIBITOR THERAPY: ICD-10-CM

## 2024-11-26 DIAGNOSIS — Z12.31 ENCOUNTER FOR SCREENING MAMMOGRAM FOR BREAST CANCER: ICD-10-CM

## 2024-11-26 DIAGNOSIS — R10.9 ABDOMINAL PAIN, UNSPECIFIED ABDOMINAL LOCATION: ICD-10-CM

## 2024-11-26 DIAGNOSIS — Z78.0 ASYMPTOMATIC MENOPAUSAL STATE: ICD-10-CM

## 2024-11-26 DIAGNOSIS — E66.812 CLASS 2 OBESITY WITHOUT SERIOUS COMORBIDITY WITH BODY MASS INDEX (BMI) OF 35.0 TO 35.9 IN ADULT, UNSPECIFIED OBESITY TYPE: ICD-10-CM

## 2024-11-26 DIAGNOSIS — K21.9 GASTROESOPHAGEAL REFLUX DISEASE WITHOUT ESOPHAGITIS: ICD-10-CM

## 2024-11-26 DIAGNOSIS — E03.9 HYPOTHYROIDISM, UNSPECIFIED TYPE: ICD-10-CM

## 2024-11-26 DIAGNOSIS — Z00.00 ROUTINE GENERAL MEDICAL EXAMINATION AT HEALTH CARE FACILITY: Primary | ICD-10-CM

## 2024-11-26 PROCEDURE — 1160F RVW MEDS BY RX/DR IN RCRD: CPT | Performed by: FAMILY MEDICINE

## 2024-11-26 PROCEDURE — G0439 PPPS, SUBSEQ VISIT: HCPCS | Performed by: FAMILY MEDICINE

## 2024-11-26 PROCEDURE — 3074F SYST BP LT 130 MM HG: CPT | Performed by: FAMILY MEDICINE

## 2024-11-26 PROCEDURE — 1126F AMNT PAIN NOTED NONE PRSNT: CPT | Performed by: FAMILY MEDICINE

## 2024-11-26 PROCEDURE — 3078F DIAST BP <80 MM HG: CPT | Performed by: FAMILY MEDICINE

## 2024-11-26 PROCEDURE — 1170F FXNL STATUS ASSESSED: CPT | Performed by: FAMILY MEDICINE

## 2024-11-26 PROCEDURE — 96372 THER/PROPH/DIAG INJ SC/IM: CPT | Performed by: FAMILY MEDICINE

## 2024-11-26 PROCEDURE — 1159F MED LIST DOCD IN RCRD: CPT | Performed by: FAMILY MEDICINE

## 2024-11-26 RX ORDER — CYANOCOBALAMIN 1000 UG/ML
1000 INJECTION, SOLUTION INTRAMUSCULAR; SUBCUTANEOUS ONCE
Status: COMPLETED | OUTPATIENT
Start: 2024-11-26 | End: 2024-11-26

## 2024-11-26 ASSESSMENT — ACTIVITIES OF DAILY LIVING (ADL)
DRESSING: INDEPENDENT
MANAGING_FINANCES: INDEPENDENT
BATHING: INDEPENDENT
GROCERY_SHOPPING: INDEPENDENT
BATHING: INDEPENDENT
DOING_HOUSEWORK: INDEPENDENT
GROCERY_SHOPPING: INDEPENDENT
MANAGING_FINANCES: INDEPENDENT
DRESSING: INDEPENDENT
DOING_HOUSEWORK: INDEPENDENT
TAKING_MEDICATION: INDEPENDENT
TAKING_MEDICATION: INDEPENDENT

## 2024-11-26 ASSESSMENT — PATIENT HEALTH QUESTIONNAIRE - PHQ9
2. FEELING DOWN, DEPRESSED OR HOPELESS: NOT AT ALL
1. LITTLE INTEREST OR PLEASURE IN DOING THINGS: NOT AT ALL
SUM OF ALL RESPONSES TO PHQ9 QUESTIONS 1 AND 2: 0

## 2024-11-26 ASSESSMENT — ENCOUNTER SYMPTOMS
OCCASIONAL FEELINGS OF UNSTEADINESS: 0
DEPRESSION: 0
LOSS OF SENSATION IN FEET: 0

## 2024-11-26 ASSESSMENT — PAIN SCALES - GENERAL: PAINLEVEL_OUTOF10: 0-NO PAIN

## 2024-11-26 NOTE — PROGRESS NOTES
Subjective   Reason for Visit: Suri Wray is an 74 y.o. female here for a Medicare Wellness visit.     Past Medical, Surgical, and Family History reviewed and updated in chart.         Will be seeing gastroenterologist CNP.  Last colonosopy 2014, so is due.  Would like to get EGD as well.  Has had chronic GERD, for which she take Nexium daily.  Uses dicyclomine as needed for post prandial epigastric pain.    Has been losing weight.  Watching what she is eating.    Exercises regularly.    Has a lot of joint pain.  Has chronic back pain that affects her activities.  Has scoliosis, saw spine specialist at , then [facility].    Has had injections for pain, which has helped some.  But there is still concern about progression of the scoliosis.  Pain was doing better when in [place] for 3 weeks.    Wonders about taking B12 injection to feel better, have more energy.        Patient Care Team:  Margy Mondragon MD as PCP - General (Family Medicine)  Margy Mondragon MD as PCP - [employer] PCP     Review of Systems    Objective   Vitals:  /76 (BP Location: Right arm, Patient Position: Sitting, BP Cuff Size: Large adult)   Pulse 66   Temp 36.2 °C (97.1 °F) (Temporal)   Wt 79.9 kg (176 lb 3.2 oz)   SpO2 99%   BMI 35.59 kg/m²       Physical Exam  Vitals reviewed.   Constitutional:       Appearance: Normal appearance.   Cardiovascular:      Rate and Rhythm: Normal rate and regular rhythm.      Heart sounds: No murmur heard.  Pulmonary:      Effort: Pulmonary effort is normal.      Breath sounds: Normal breath sounds.   Musculoskeletal:      Right lower leg: No edema.      Left lower leg: No edema.   Neurological:      Mental Status: She is alert.   Psychiatric:         Mood and Affect: Mood normal.         Behavior: Behavior normal.         Assessment & Plan  Long-term current use of proton pump inhibitor therapy    Orders:    cyanocobalamin (Vitamin B-12) injection 1,000 mcg    Routine general medical  examination at health care facility    Orders:    1 Year Follow Up In [facility] - PCP - Wellness Exam; Future    Asymptomatic menopausal state    Orders:    XR DEXA bone density; Future    Encounter for screening mammogram for breast cancer    Orders:    BI mammo bilateral screening tomosynthesis; Future    Gastroesophageal reflux disease without esophagitis    Orders:    esomeprazole (NexIUM) 40 mg DR capsule; Take 1 capsule (40 mg) by mouth once daily in the morning. Take before meals. Do not open capsule.    Abdominal pain, unspecified abdominal location    Orders:    dicyclomine (Bentyl) 20 mg tablet; Take 1 tablet (20 mg) by mouth 4 times a day as needed (cramps).    Hypothyroidism, unspecified type    Orders:    levothyroxine (Synthroid, Levoxyl) 150 mcg tablet; Take 1 tablet (150 mcg) by mouth once daily.    Class 2 obesity without serious comorbidity with body mass index (BMI) of 35.0 to 35.9 in adult, unspecified obesity type

## 2024-11-27 RX ORDER — LEVOTHYROXINE SODIUM 150 UG/1
150 TABLET ORAL DAILY
Qty: 90 TABLET | Refills: 1 | Status: SHIPPED | OUTPATIENT
Start: 2024-11-27

## 2024-11-27 RX ORDER — ESOMEPRAZOLE MAGNESIUM 40 MG/1
40 CAPSULE, DELAYED RELEASE ORAL
Qty: 90 CAPSULE | Refills: 1 | Status: SHIPPED | OUTPATIENT
Start: 2024-11-27 | End: 2025-05-26

## 2024-11-27 RX ORDER — DICYCLOMINE HYDROCHLORIDE 20 MG/1
20 TABLET ORAL 4 TIMES DAILY PRN
Qty: 120 TABLET | Refills: 1 | Status: SHIPPED | OUTPATIENT
Start: 2024-11-27 | End: 2025-11-27

## 2024-11-27 NOTE — PATIENT INSTRUCTIONS
Immunizations recommended:  --Flu shot every fall. -done  --Prevnar 20 once,  to reduce risk of pneumonia.  --Shingrix,to reduce risk of getting shingles.  It can be done at the pharmacy  --Tetanus every 10 years.  --Covid vaccine.  --RSV vaccine. -done    If your pap smears have been normal, you can do them every 3-5 years, and stop after the age of 65.    Colonoscopy should be done every 10 years after the age of 45, unless there was a previous abnormality, or family history of colon cancer.  Yours was done in 2014, so due this year. Will be seeing gastroenterologist    Mammogram screening recommendations are changing, but at this time, I recommend a mammogram every 1-2 years in your 40's, and yearly after the age of 50.  Having a family history of breast cancer may change that.  Will get at [facility].    You should try to get 150 minutes of exercise weekly.  Be sure to eat a diet rich in fruits and vegetables, and low in saturated fats and sodium.  Strive for a healthy BMI of less than 25.     Make sure to wear sun screen when outside, and check for changing or unusual moles.    Pre-menopause recommendations are for 1000 mg calcium and 1000 units vitamin D3 daily.  After menopause calcium recommendation is 1200 mg, with 1000 units of vitamin D3  Bone density can be done every 2 years to assess bone loss. -ordered    I recommend you get a Living Will and Durable Power of  for Healthcare. These documents state what care you would want if you couldn't speak for yourself. They help your loved ones in caring for you if that happens.   Once you have those forms completed, you can keep a copy on file with your doctor.    Specialists being seen:  Gastroenterologist    Epigastric and right upper abdominal pain, with history of gastritis, hiatal hernia, and mild schatzki's ring.  Continue Nexium 40 mg at night, and pepcid as well.  Can use dicyclomine as needed.  Will be seeing gastroenterologist.  Discussed pros and  cons of ongoing PPI medication (Nexium).    Vitamin B12 injection given today.  Can do up to monthly if it is helpful.  Should also check levels with next blood draw.    For thyroid, taking 150 mcg daily.

## 2024-11-27 NOTE — ASSESSMENT & PLAN NOTE
Orders:    levothyroxine (Synthroid, Levoxyl) 150 mcg tablet; Take 1 tablet (150 mcg) by mouth once daily.

## 2024-12-02 ENCOUNTER — TELEPHONE (OUTPATIENT)
Dept: PRIMARY CARE | Facility: CLINIC | Age: 74
End: 2024-12-02
Payer: MEDICARE

## 2024-12-02 NOTE — TELEPHONE ENCOUNTER
Suri needs order for Bone Density, she would like to be notified once order has been place so she can schedule

## 2025-01-03 DIAGNOSIS — K44.9 HIATAL HERNIA: Primary | ICD-10-CM

## 2025-01-09 ENCOUNTER — HOSPITAL ENCOUNTER (OUTPATIENT)
Dept: RADIOLOGY | Facility: CLINIC | Age: 75
Discharge: HOME | End: 2025-01-09
Payer: MEDICARE

## 2025-01-09 DIAGNOSIS — Z78.0 ASYMPTOMATIC MENOPAUSAL STATE: ICD-10-CM

## 2025-01-09 PROCEDURE — 77080 DXA BONE DENSITY AXIAL: CPT | Performed by: RADIOLOGY

## 2025-01-09 PROCEDURE — 77080 DXA BONE DENSITY AXIAL: CPT

## 2025-01-26 DIAGNOSIS — R42 VERTIGO: Primary | ICD-10-CM

## 2025-02-18 ENCOUNTER — OFFICE VISIT (OUTPATIENT)
Dept: SURGERY | Facility: CLINIC | Age: 75
End: 2025-02-18
Payer: MEDICARE

## 2025-02-18 VITALS
OXYGEN SATURATION: 98 % | HEART RATE: 71 BPM | DIASTOLIC BLOOD PRESSURE: 92 MMHG | TEMPERATURE: 98.6 F | BODY MASS INDEX: 33.57 KG/M2 | HEIGHT: 60 IN | SYSTOLIC BLOOD PRESSURE: 179 MMHG | WEIGHT: 171 LBS

## 2025-02-18 DIAGNOSIS — K44.9 HIATAL HERNIA: Primary | ICD-10-CM

## 2025-02-18 PROCEDURE — 1036F TOBACCO NON-USER: CPT | Performed by: SURGERY

## 2025-02-18 PROCEDURE — 3077F SYST BP >= 140 MM HG: CPT | Performed by: SURGERY

## 2025-02-18 PROCEDURE — 3008F BODY MASS INDEX DOCD: CPT | Performed by: SURGERY

## 2025-02-18 PROCEDURE — 1160F RVW MEDS BY RX/DR IN RCRD: CPT | Performed by: SURGERY

## 2025-02-18 PROCEDURE — 1159F MED LIST DOCD IN RCRD: CPT | Performed by: SURGERY

## 2025-02-18 PROCEDURE — 99214 OFFICE O/P EST MOD 30 MIN: CPT | Performed by: SURGERY

## 2025-02-18 PROCEDURE — 3080F DIAST BP >= 90 MM HG: CPT | Performed by: SURGERY

## 2025-02-18 PROCEDURE — 99204 OFFICE O/P NEW MOD 45 MIN: CPT | Performed by: SURGERY

## 2025-02-18 NOTE — PROGRESS NOTES
History Of Present Illness  Suri Wray is a 74 y.o. female presenting   with increasingly symptomatic large hiatal hernia.  She has noticed noticed to have this hernia several years ago.  She is now having postprandial chest pain.  She notices that she is getting more short of breath at times.  She has very little dysphagia.  She had a recent CT scan and upper endoscopy that showed this.  I do not have access to those films we will ask for them to be sent over.  She had a previous open gallbladder surgery 35 years ago.  She is no longer working.  She previously ran an art gallery.  No history of heart disease.  She has had a recent colonoscopy.        Last Recorded Vitals  Blood pressure (!) 179/92, pulse 71, temperature 37 °C (98.6 °F), height 1.524 m (5'), weight 77.6 kg (171 lb), SpO2 98%.  Physical Examination  Awake and alert.  Normal respiration.  Abdomen she has a right upper quadrant scar.  No evidence of any hernias.    Relevant Results  I reviewed her recent results from the [facility].  I discussed with her that I will ask for the films to be sent over.  Assessment/Plan   Patient with very large hiatal hernia.  I will obtain a barium swallow to assess for esophageal length.  There is some discussion at the [facility] of having a esophageal lengthening procedure.  Her biggest concern is that she had some metaplasia on one of her biopsies.  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  I did discuss with her both medical and surgical management if they are both successful managing her symptoms are equal and decreasing this risk for conversion to esophageal cancer.  As discussed with her age this should not be a concern with her.  I did discuss that she does have a true foreshortened esophagus that would change the complexity of the operation.  Presently she would like to wait till June for operation for her granddaughter graduating from college.  I feel  there is any problems with that.  I will review her barium swallow and look at her CT scan before we decide on surgery versus.  Continued medical management.      Asif Hooks MD FACS  Professor of Surgery  Chantel Wen Chair in Surgical [place]  [facility] [employer]  [id number] [place] [address], [id number]  Phone [phone]  email: [email or web address]

## 2025-02-19 ENCOUNTER — HOSPITAL ENCOUNTER (OUTPATIENT)
Facility: HOSPITAL | Age: 75
Setting detail: OUTPATIENT SURGERY
End: 2025-02-19
Attending: SURGERY | Admitting: SURGERY
Payer: MEDICARE

## 2025-02-19 DIAGNOSIS — K44.9 HIATAL HERNIA: Primary | ICD-10-CM

## 2025-02-19 DIAGNOSIS — G89.18 ACUTE POST-OPERATIVE PAIN: ICD-10-CM

## 2025-02-22 DIAGNOSIS — R10.9 ABDOMINAL PAIN, UNSPECIFIED ABDOMINAL LOCATION: ICD-10-CM

## 2025-02-24 RX ORDER — DICYCLOMINE HYDROCHLORIDE 20 MG/1
20 TABLET ORAL 4 TIMES DAILY PRN
Qty: 360 TABLET | Refills: 3 | Status: SHIPPED | OUTPATIENT
Start: 2025-02-24 | End: 2026-02-24

## 2025-03-07 ENCOUNTER — HOSPITAL ENCOUNTER (OUTPATIENT)
Dept: RADIOLOGY | Facility: HOSPITAL | Age: 75
Discharge: HOME | End: 2025-03-07
Payer: MEDICARE

## 2025-03-07 DIAGNOSIS — K44.9 HIATAL HERNIA: ICD-10-CM

## 2025-03-07 PROCEDURE — A9698 NON-RAD CONTRAST MATERIALNOC: HCPCS | Performed by: SURGERY

## 2025-03-07 PROCEDURE — 2500000001 HC RX 250 WO HCPCS SELF ADMINISTERED DRUGS (ALT 637 FOR MEDICARE OP): Performed by: SURGERY

## 2025-03-07 PROCEDURE — 74220 X-RAY XM ESOPHAGUS 1CNTRST: CPT

## 2025-03-07 PROCEDURE — 2500000005 HC RX 250 GENERAL PHARMACY W/O HCPCS: Performed by: SURGERY

## 2025-03-07 RX ADMIN — BARIUM SULFATE 70 ML: 980 POWDER, FOR SUSPENSION ORAL at 12:41

## 2025-03-07 RX ADMIN — ANTACID/ANTIFLATULENT 1 PACKET: 380; 550; 10; 10 GRANULE, EFFERVESCENT ORAL at 12:41

## 2025-03-07 RX ADMIN — BARIUM SULFATE 60 ML: 960 POWDER, FOR SUSPENSION ORAL at 12:42

## 2025-03-10 ENCOUNTER — TELEPHONE (OUTPATIENT)
Dept: SURGERY | Facility: HOSPITAL | Age: 75
End: 2025-03-10
Payer: MEDICARE

## 2025-03-10 NOTE — TELEPHONE ENCOUNTER
Discussed the results of the barium swallow and the review of the CT scan.  We have the have the CT scan sent over from the other facility.  She does have a large hiatal hernia.  I do not think she has a foreshortened esophagus.  She did have some esophageal dysmotility*plan will be just to fix the large hiatal hernia.  She agrees with this plan and she will wait so after her granddaughters graduation from college in May for surgery I answered all of her questions and she will call me if she has any other problems.

## 2025-03-12 ENCOUNTER — OFFICE VISIT (OUTPATIENT)
Dept: URGENT CARE | Age: 75
End: 2025-03-12
Payer: MEDICARE

## 2025-03-12 VITALS
DIASTOLIC BLOOD PRESSURE: 80 MMHG | HEART RATE: 90 BPM | OXYGEN SATURATION: 98 % | SYSTOLIC BLOOD PRESSURE: 149 MMHG | TEMPERATURE: 98.1 F

## 2025-03-12 DIAGNOSIS — K21.9 GASTROESOPHAGEAL REFLUX DISEASE, UNSPECIFIED WHETHER ESOPHAGITIS PRESENT: Primary | ICD-10-CM

## 2025-03-12 DIAGNOSIS — R52 BODY ACHES: ICD-10-CM

## 2025-03-12 DIAGNOSIS — R11.0 NAUSEA: ICD-10-CM

## 2025-03-12 LAB
POC BINAX EXPIRATION: 0
POC BINAX NOW COVID SERIAL NUMBER: 0
POC RAPID INFLUENZA A: NEGATIVE
POC RAPID INFLUENZA B: NEGATIVE
POC SARS-COV-2 AG BINAX: NORMAL

## 2025-03-12 RX ORDER — ONDANSETRON 4 MG/1
4 TABLET, FILM COATED ORAL EVERY 8 HOURS PRN
Qty: 10 TABLET | Refills: 0 | Status: SHIPPED | OUTPATIENT
Start: 2025-03-12 | End: 2025-03-19

## 2025-03-12 ASSESSMENT — ENCOUNTER SYMPTOMS
APPETITE CHANGE: 0
CHILLS: 1
NAUSEA: 1
DIARRHEA: 1

## 2025-03-12 NOTE — PROGRESS NOTES
Subjective   Patient ID: Suri Wray is a 74 y.o. female. They present today with a chief complaint of Nausea (Nausea, heart burn. Pt. Had a procedure with barium on Friday and that may be the reason for these symptoms. ).    History of Present Illness  HPI  74-year-old female came in with a complaint of nausea, heartburn, chills and diarrhea.  Patient take some Zofran yesterday for nausea and imodium for diarrhea. last Friday patient has esophagram done.  Some of the symptoms can be a result from a barium swallow.  Patient is requesting to check for flu and COVID.  Patient has a hiatal hernia and the surgery will be done in July.  Denies any vomiting, abdominal discomfort, chest pain, chest tightness, shortness of breath.  Patient has prescribed Nexium for heartburn.  Patient had 5 episodes of diarrhea yesterday,  none today.     Past Medical History  Allergies as of 03/12/2025 - Reviewed 03/12/2025   Allergen Reaction Noted    Codeine Itching 04/27/2023       (Not in a hospital admission)       Past Medical History:   Diagnosis Date    Abdominal pain, RUQ (right upper quadrant) 04/27/2023    Acute upper respiratory infection, unspecified 09/09/2016    URTI (acute upper respiratory infection)    Fever, unspecified 04/01/2019    Elevated temperature    Other conditions influencing health status 10/23/2013    Sprain Of Left Side Of Back    Other conditions influencing health status 09/13/2016    History of cough    Other conditions influencing health status 06/29/2017    Body mass index (BMI) of 30.0 to 39.9    Personal history of other diseases of the respiratory system 09/13/2016    History of acute sinusitis    Personal history of other diseases of the respiratory system 07/07/2018    History of acute sinusitis    Personal history of other diseases of the respiratory system 09/29/2014    Personal history of acute sinusitis    Personal history of other specified conditions     History of diarrhea    Personal  history of other specified conditions 04/14/2018    History of diarrhea    Trochanteric bursitis, left hip 06/29/2017    Greater trochanteric bursitis, left       Past Surgical History:   Procedure Laterality Date    COLONOSCOPY  09/04/2015    Complete Colonoscopy    OTHER SURGICAL HISTORY  09/04/2015    Cholecystotomy        reports that she has never smoked. She has never used smokeless tobacco. She reports current alcohol use. She reports that she does not use drugs.    Review of Systems  Review of Systems   Constitutional:  Positive for chills. Negative for appetite change.   Gastrointestinal:  Positive for diarrhea and nausea.                                  Objective    Vitals:    03/12/25 1224   BP: 149/80   BP Location: Right arm   Patient Position: Sitting   BP Cuff Size: Large adult   Pulse: 90   Temp: 36.7 °C (98.1 °F)   TempSrc: Oral   SpO2: 98%     No LMP recorded.    Physical Exam  Constitutional:       Appearance: Normal appearance.   Cardiovascular:      Rate and Rhythm: Normal rate and regular rhythm.      Pulses: Normal pulses.      Heart sounds: Normal heart sounds.   Pulmonary:      Effort: Pulmonary effort is normal.      Breath sounds: Normal breath sounds.   Abdominal:      General: Bowel sounds are normal.      Palpations: Abdomen is soft.   Skin:     General: Skin is warm.      Capillary Refill: Capillary refill takes less than 2 seconds.   Neurological:      General: No focal deficit present.      Mental Status: She is alert and oriented to person, place, and time.   Psychiatric:         Mood and Affect: Mood normal.         Behavior: Behavior normal.         Thought Content: Thought content normal.         Judgment: Judgment normal.         Procedures    Point of Care Test & Imaging Results from this visit  Results for orders placed or performed in visit on 03/12/25   POCT Covid-19 Rapid Antigen   Result Value Ref Range    Binax NOW Covid Serial Number 0     BINAX NOW Covid Expiration 0      POC KHRIS-COV-2 AG  Presumptive negative test for SARS-CoV-2 (no antigen detected)     Presumptive negative test for SARS-CoV-2 (no antigen detected)   POCT Influenza A/B manually resulted   Result Value Ref Range    POC Rapid Influenza A Negative Negative    POC Rapid Influenza B Negative Negative      No results found.    Diagnostic study results (if any) were reviewed by [facility].    Assessment/Plan   Allergies, medications, history, and pertinent labs/EKGs/Imaging reviewed by CHEO Anaya.     Medical Decision Making  Tested for Covid and flu both negative., take tylenol for chills and body aches.   Prescribed : Zofran   Pt was advised on supportive therapies, including increasing fluid intake , eating smaller meals, refrain from eating copious amounts of irritating foods (fatty foods, milk products, chocolate, and caffeine), advancing fluids as tolerated  [place] diet  Maintain Fluid Intake: Pedialyte/Gatorade, Juice, Non-caffenated Pop   Instructed to follow up with primary provider or GI with new or worsening symptoms such as persistent fevers, dehydration, uncontrolled vomiting    Orders and Diagnoses  Diagnoses and all orders for this visit:  Gastroesophageal reflux disease, unspecified whether esophagitis present  Body aches  -     POCT Covid-19 Rapid Antigen  -     POCT Influenza A/B manually resulted  Nausea  -     ondansetron (Zofran) 4 mg tablet; Take 1 tablet (4 mg) by mouth every 8 hours if needed for nausea or vomiting for up to 7 days.      Medical Admin Record      Patient disposition: Home    Electronically signed by [facility]  1:10 PM

## 2025-04-27 DIAGNOSIS — M54.16 CHRONIC LUMBAR RADICULOPATHY: Primary | ICD-10-CM

## 2025-04-27 RX ORDER — DICLOFENAC SODIUM 10 MG/G
4 GEL TOPICAL 4 TIMES DAILY
Qty: 100 G | Refills: 0 | Status: SHIPPED | OUTPATIENT
Start: 2025-04-27

## 2025-06-08 ENCOUNTER — ANESTHESIA EVENT (OUTPATIENT)
Dept: OPERATING ROOM | Facility: HOSPITAL | Age: 75
End: 2025-06-08
Payer: MEDICARE

## 2025-06-08 DIAGNOSIS — K21.9 GASTROESOPHAGEAL REFLUX DISEASE WITHOUT ESOPHAGITIS: ICD-10-CM

## 2025-06-08 RX ORDER — ESOMEPRAZOLE MAGNESIUM 40 MG/1
40 CAPSULE, DELAYED RELEASE ORAL
Qty: 90 CAPSULE | Refills: 1 | Status: SHIPPED | OUTPATIENT
Start: 2025-06-08 | End: 2025-12-05

## 2025-06-09 ENCOUNTER — APPOINTMENT (OUTPATIENT)
Dept: RADIOLOGY | Facility: HOSPITAL | Age: 75
End: 2025-06-09
Payer: MEDICARE

## 2025-06-09 ENCOUNTER — ANESTHESIA (OUTPATIENT)
Dept: OPERATING ROOM | Facility: HOSPITAL | Age: 75
End: 2025-06-09
Payer: MEDICARE

## 2025-06-09 PROBLEM — Z87.19 S/P REPAIR OF PARAESOPHAGEAL HERNIA: Status: ACTIVE | Noted: 2025-06-09

## 2025-06-09 PROBLEM — Z98.890 S/P REPAIR OF PARAESOPHAGEAL HERNIA: Status: ACTIVE | Noted: 2025-06-09

## 2025-06-09 LAB
ABO GROUP (TYPE) IN BLOOD: NORMAL
ANION GAP BLDV CALCULATED.4IONS-SCNC: 7 MMOL/L (ref 10–25)
ANTIBODY SCREEN: NORMAL
BASE EXCESS BLDV CALC-SCNC: 0 MMOL/L (ref -2–3)
BODY TEMPERATURE: 37 DEGREES CELSIUS
CA-I BLDV-SCNC: 1.2 MMOL/L (ref 1.1–1.33)
CHLORIDE BLDV-SCNC: 104 MMOL/L (ref 98–107)
GLUCOSE BLDV-MCNC: 87 MG/DL (ref 74–99)
HCO3 BLDV-SCNC: 24 MMOL/L (ref 22–26)
HCT VFR BLD EST: 63 % (ref 36–46)
HGB BLDV-MCNC: 21.1 G/DL (ref 12–16)
INHALED O2 CONCENTRATION: 25 %
LACTATE BLDV-SCNC: 1.2 MMOL/L (ref 0.4–2)
OXYHGB MFR BLDV: 88.5 % (ref 45–75)
PCO2 BLDV: 37 MM HG (ref 41–51)
PH BLDV: 7.42 PH (ref 7.33–7.43)
PO2 BLDV: 63 MM HG (ref 35–45)
POTASSIUM BLDV-SCNC: 4.1 MMOL/L (ref 3.5–5.3)
RH FACTOR (ANTIGEN D): NORMAL
SAO2 % BLDV: 91 % (ref 45–75)
SODIUM BLDV-SCNC: 131 MMOL/L (ref 136–145)

## 2025-06-09 PROCEDURE — 7100000011 HC EXTENDED STAY RECOVERY HOURLY - NURSING UNIT

## 2025-06-09 PROCEDURE — 2720000007 HC OR 272 NO HCPCS: Performed by: SURGERY

## 2025-06-09 PROCEDURE — 93010 ELECTROCARDIOGRAM REPORT: CPT | Performed by: INTERNAL MEDICINE

## 2025-06-09 PROCEDURE — 3600000009 HC OR TIME - EACH INCREMENTAL 1 MINUTE - PROCEDURE LEVEL FOUR: Performed by: SURGERY

## 2025-06-09 PROCEDURE — 86901 BLOOD TYPING SEROLOGIC RH(D): CPT | Performed by: ANESTHESIOLOGY

## 2025-06-09 PROCEDURE — 2500000005 HC RX 250 GENERAL PHARMACY W/O HCPCS: Performed by: SURGERY

## 2025-06-09 PROCEDURE — 2500000004 HC RX 250 GENERAL PHARMACY W/ HCPCS (ALT 636 FOR OP/ED)

## 2025-06-09 PROCEDURE — 7100000002 HC RECOVERY ROOM TIME - EACH INCREMENTAL 1 MINUTE: Performed by: SURGERY

## 2025-06-09 PROCEDURE — 3600000004 HC OR TIME - INITIAL BASE CHARGE - PROCEDURE LEVEL FOUR: Performed by: SURGERY

## 2025-06-09 PROCEDURE — 84132 ASSAY OF SERUM POTASSIUM: CPT | Performed by: ANESTHESIOLOGY

## 2025-06-09 PROCEDURE — 2500000001 HC RX 250 WO HCPCS SELF ADMINISTERED DRUGS (ALT 637 FOR MEDICARE OP): Performed by: ANESTHESIOLOGY

## 2025-06-09 PROCEDURE — P9045 ALBUMIN (HUMAN), 5%, 250 ML: HCPCS | Mod: JZ,TB

## 2025-06-09 PROCEDURE — 96372 THER/PROPH/DIAG INJ SC/IM: CPT | Performed by: STUDENT IN AN ORGANIZED HEALTH CARE EDUCATION/TRAINING PROGRAM

## 2025-06-09 PROCEDURE — 2500000004 HC RX 250 GENERAL PHARMACY W/ HCPCS (ALT 636 FOR OP/ED): Performed by: ANESTHESIOLOGY

## 2025-06-09 PROCEDURE — 2500000001 HC RX 250 WO HCPCS SELF ADMINISTERED DRUGS (ALT 637 FOR MEDICARE OP): Performed by: STUDENT IN AN ORGANIZED HEALTH CARE EDUCATION/TRAINING PROGRAM

## 2025-06-09 PROCEDURE — A43281 PR LAP, REPAIR PARAESOPHAGEAL HERNIA, INCL FUNDOPLASTY W/O MESH: Performed by: ANESTHESIOLOGY

## 2025-06-09 PROCEDURE — 74018 RADEX ABDOMEN 1 VIEW: CPT

## 2025-06-09 PROCEDURE — 7100000001 HC RECOVERY ROOM TIME - INITIAL BASE CHARGE: Performed by: SURGERY

## 2025-06-09 PROCEDURE — 3700000002 HC GENERAL ANESTHESIA TIME - EACH INCREMENTAL 1 MINUTE: Performed by: SURGERY

## 2025-06-09 PROCEDURE — 36415 COLL VENOUS BLD VENIPUNCTURE: CPT | Performed by: ANESTHESIOLOGY

## 2025-06-09 PROCEDURE — 43235 EGD DIAGNOSTIC BRUSH WASH: CPT | Performed by: SURGERY

## 2025-06-09 PROCEDURE — 43281 LAP PARAESOPHAG HERN REPAIR: CPT | Performed by: SURGERY

## 2025-06-09 PROCEDURE — 3700000001 HC GENERAL ANESTHESIA TIME - INITIAL BASE CHARGE: Performed by: SURGERY

## 2025-06-09 PROCEDURE — 2500000005 HC RX 250 GENERAL PHARMACY W/O HCPCS

## 2025-06-09 PROCEDURE — 2500000004 HC RX 250 GENERAL PHARMACY W/ HCPCS (ALT 636 FOR OP/ED): Mod: JZ,TB

## 2025-06-09 PROCEDURE — 2500000004 HC RX 250 GENERAL PHARMACY W/ HCPCS (ALT 636 FOR OP/ED): Performed by: STUDENT IN AN ORGANIZED HEALTH CARE EDUCATION/TRAINING PROGRAM

## 2025-06-09 PROCEDURE — 2500000005 HC RX 250 GENERAL PHARMACY W/O HCPCS: Performed by: STUDENT IN AN ORGANIZED HEALTH CARE EDUCATION/TRAINING PROGRAM

## 2025-06-09 PROCEDURE — 99100 ANES PT EXTEME AGE<1 YR&>70: CPT | Performed by: ANESTHESIOLOGY

## 2025-06-09 RX ORDER — HYDROMORPHONE HYDROCHLORIDE 0.2 MG/ML
0.2 INJECTION INTRAMUSCULAR; INTRAVENOUS; SUBCUTANEOUS EVERY 5 MIN PRN
Status: DISCONTINUED | OUTPATIENT
Start: 2025-06-09 | End: 2025-06-09 | Stop reason: HOSPADM

## 2025-06-09 RX ORDER — PANTOPRAZOLE SODIUM 40 MG/1
40 TABLET, DELAYED RELEASE ORAL
Status: DISCONTINUED | OUTPATIENT
Start: 2025-06-10 | End: 2025-06-10 | Stop reason: HOSPADM

## 2025-06-09 RX ORDER — OXYCODONE HYDROCHLORIDE 5 MG/1
10 TABLET ORAL EVERY 4 HOURS PRN
Status: DISCONTINUED | OUTPATIENT
Start: 2025-06-09 | End: 2025-06-09 | Stop reason: HOSPADM

## 2025-06-09 RX ORDER — ACETAMINOPHEN 325 MG/1
650 TABLET ORAL EVERY 6 HOURS
Status: DISCONTINUED | OUTPATIENT
Start: 2025-06-09 | End: 2025-06-10 | Stop reason: HOSPADM

## 2025-06-09 RX ORDER — ONDANSETRON 4 MG/1
4 TABLET, ORALLY DISINTEGRATING ORAL EVERY 8 HOURS PRN
Status: DISCONTINUED | OUTPATIENT
Start: 2025-06-09 | End: 2025-06-10 | Stop reason: HOSPADM

## 2025-06-09 RX ORDER — DIPHENHYDRAMINE HYDROCHLORIDE 50 MG/ML
12.5 INJECTION, SOLUTION INTRAMUSCULAR; INTRAVENOUS ONCE AS NEEDED
Status: DISCONTINUED | OUTPATIENT
Start: 2025-06-09 | End: 2025-06-09 | Stop reason: HOSPADM

## 2025-06-09 RX ORDER — SODIUM CHLORIDE, SODIUM LACTATE, POTASSIUM CHLORIDE, CALCIUM CHLORIDE 600; 310; 30; 20 MG/100ML; MG/100ML; MG/100ML; MG/100ML
50 INJECTION, SOLUTION INTRAVENOUS CONTINUOUS
Status: DISCONTINUED | OUTPATIENT
Start: 2025-06-09 | End: 2025-06-10

## 2025-06-09 RX ORDER — HYDROMORPHONE HYDROCHLORIDE 1 MG/ML
INJECTION, SOLUTION INTRAMUSCULAR; INTRAVENOUS; SUBCUTANEOUS AS NEEDED
Status: DISCONTINUED | OUTPATIENT
Start: 2025-06-09 | End: 2025-06-09

## 2025-06-09 RX ORDER — OXYCODONE HYDROCHLORIDE 5 MG/1
5 TABLET ORAL EVERY 4 HOURS PRN
Status: DISCONTINUED | OUTPATIENT
Start: 2025-06-09 | End: 2025-06-09 | Stop reason: HOSPADM

## 2025-06-09 RX ORDER — LIDOCAINE HYDROCHLORIDE 20 MG/ML
INJECTION, SOLUTION INFILTRATION; PERINEURAL AS NEEDED
Status: DISCONTINUED | OUTPATIENT
Start: 2025-06-09 | End: 2025-06-09

## 2025-06-09 RX ORDER — DEXMEDETOMIDINE HYDROCHLORIDE 100 UG/ML
INJECTION, SOLUTION INTRAVENOUS AS NEEDED
Status: DISCONTINUED | OUTPATIENT
Start: 2025-06-09 | End: 2025-06-09

## 2025-06-09 RX ORDER — CELECOXIB 200 MG/1
200 CAPSULE ORAL ONCE
Status: COMPLETED | OUTPATIENT
Start: 2025-06-09 | End: 2025-06-09

## 2025-06-09 RX ORDER — ONDANSETRON HYDROCHLORIDE 2 MG/ML
4 INJECTION, SOLUTION INTRAVENOUS ONCE AS NEEDED
Status: COMPLETED | OUTPATIENT
Start: 2025-06-09 | End: 2025-06-09

## 2025-06-09 RX ORDER — SODIUM CHLORIDE, SODIUM LACTATE, POTASSIUM CHLORIDE, CALCIUM CHLORIDE 600; 310; 30; 20 MG/100ML; MG/100ML; MG/100ML; MG/100ML
100 INJECTION, SOLUTION INTRAVENOUS CONTINUOUS
Status: ACTIVE | OUTPATIENT
Start: 2025-06-09 | End: 2025-06-09

## 2025-06-09 RX ORDER — ALBUTEROL SULFATE 0.83 MG/ML
2.5 SOLUTION RESPIRATORY (INHALATION) ONCE AS NEEDED
Status: DISCONTINUED | OUTPATIENT
Start: 2025-06-09 | End: 2025-06-09 | Stop reason: HOSPADM

## 2025-06-09 RX ORDER — METOCLOPRAMIDE HYDROCHLORIDE 5 MG/ML
10 INJECTION INTRAMUSCULAR; INTRAVENOUS ONCE AS NEEDED
Status: DISCONTINUED | OUTPATIENT
Start: 2025-06-09 | End: 2025-06-09 | Stop reason: HOSPADM

## 2025-06-09 RX ORDER — METHOCARBAMOL 100 MG/ML
1000 INJECTION, SOLUTION INTRAMUSCULAR; INTRAVENOUS ONCE
Status: COMPLETED | OUTPATIENT
Start: 2025-06-09 | End: 2025-06-09

## 2025-06-09 RX ORDER — ENOXAPARIN SODIUM 100 MG/ML
40 INJECTION SUBCUTANEOUS EVERY 24 HOURS
Status: DISCONTINUED | OUTPATIENT
Start: 2025-06-09 | End: 2025-06-10 | Stop reason: HOSPADM

## 2025-06-09 RX ORDER — POLYETHYLENE GLYCOL 3350 17 G/17G
17 POWDER, FOR SOLUTION ORAL DAILY
Status: DISCONTINUED | OUTPATIENT
Start: 2025-06-10 | End: 2025-06-10 | Stop reason: HOSPADM

## 2025-06-09 RX ORDER — WATER 1 ML/ML
INJECTION IRRIGATION AS NEEDED
Status: DISCONTINUED | OUTPATIENT
Start: 2025-06-09 | End: 2025-06-09 | Stop reason: HOSPADM

## 2025-06-09 RX ORDER — PANTOPRAZOLE SODIUM 40 MG/10ML
40 INJECTION, POWDER, LYOPHILIZED, FOR SOLUTION INTRAVENOUS
Status: DISCONTINUED | OUTPATIENT
Start: 2025-06-10 | End: 2025-06-10 | Stop reason: HOSPADM

## 2025-06-09 RX ORDER — OXYCODONE HYDROCHLORIDE 5 MG/1
5 TABLET ORAL EVERY 6 HOURS PRN
Status: DISCONTINUED | OUTPATIENT
Start: 2025-06-09 | End: 2025-06-10 | Stop reason: HOSPADM

## 2025-06-09 RX ORDER — LIDOCAINE HYDROCHLORIDE 10 MG/ML
0.1 INJECTION, SOLUTION EPIDURAL; INFILTRATION; INTRACAUDAL; PERINEURAL ONCE
Status: DISCONTINUED | OUTPATIENT
Start: 2025-06-09 | End: 2025-06-09 | Stop reason: HOSPADM

## 2025-06-09 RX ORDER — ENOXAPARIN SODIUM 100 MG/ML
40 INJECTION SUBCUTANEOUS ONCE
Status: COMPLETED | OUTPATIENT
Start: 2025-06-09 | End: 2025-06-09

## 2025-06-09 RX ORDER — LEVOTHYROXINE SODIUM 75 UG/1
150 TABLET ORAL DAILY
Status: DISCONTINUED | OUTPATIENT
Start: 2025-06-10 | End: 2025-06-10 | Stop reason: HOSPADM

## 2025-06-09 RX ORDER — HYDRALAZINE HYDROCHLORIDE 20 MG/ML
5 INJECTION INTRAMUSCULAR; INTRAVENOUS EVERY 30 MIN PRN
Status: DISCONTINUED | OUTPATIENT
Start: 2025-06-09 | End: 2025-06-09 | Stop reason: HOSPADM

## 2025-06-09 RX ORDER — GABAPENTIN 300 MG/1
300 CAPSULE ORAL ONCE
Status: COMPLETED | OUTPATIENT
Start: 2025-06-09 | End: 2025-06-09

## 2025-06-09 RX ORDER — SODIUM CHLORIDE 0.9 G/100ML
INJECTION, SOLUTION IRRIGATION AS NEEDED
Status: DISCONTINUED | OUTPATIENT
Start: 2025-06-09 | End: 2025-06-09 | Stop reason: HOSPADM

## 2025-06-09 RX ORDER — METHOCARBAMOL 500 MG/1
500 TABLET, FILM COATED ORAL EVERY 8 HOURS SCHEDULED
Status: DISCONTINUED | OUTPATIENT
Start: 2025-06-09 | End: 2025-06-10 | Stop reason: HOSPADM

## 2025-06-09 RX ORDER — ROCURONIUM BROMIDE 10 MG/ML
INJECTION, SOLUTION INTRAVENOUS AS NEEDED
Status: DISCONTINUED | OUTPATIENT
Start: 2025-06-09 | End: 2025-06-09

## 2025-06-09 RX ORDER — IBUPROFEN 400 MG/1
400 TABLET, FILM COATED ORAL EVERY 8 HOURS PRN
Status: DISCONTINUED | OUTPATIENT
Start: 2025-06-09 | End: 2025-06-10 | Stop reason: HOSPADM

## 2025-06-09 RX ORDER — BUPIVACAINE HCL/EPINEPHRINE 0.5-1:200K
VIAL (ML) INJECTION AS NEEDED
Status: DISCONTINUED | OUTPATIENT
Start: 2025-06-09 | End: 2025-06-09 | Stop reason: HOSPADM

## 2025-06-09 RX ORDER — LABETALOL HYDROCHLORIDE 5 MG/ML
5 INJECTION, SOLUTION INTRAVENOUS ONCE AS NEEDED
Status: DISCONTINUED | OUTPATIENT
Start: 2025-06-09 | End: 2025-06-09 | Stop reason: HOSPADM

## 2025-06-09 RX ORDER — ACETAMINOPHEN 325 MG/1
975 TABLET ORAL ONCE
Status: COMPLETED | OUTPATIENT
Start: 2025-06-09 | End: 2025-06-09

## 2025-06-09 RX ORDER — NORETHINDRONE AND ETHINYL ESTRADIOL 0.5-0.035
KIT ORAL AS NEEDED
Status: DISCONTINUED | OUTPATIENT
Start: 2025-06-09 | End: 2025-06-09

## 2025-06-09 RX ORDER — ACETAMINOPHEN 650 MG/1
650 SUPPOSITORY RECTAL EVERY 6 HOURS
Status: DISCONTINUED | OUTPATIENT
Start: 2025-06-09 | End: 2025-06-10 | Stop reason: HOSPADM

## 2025-06-09 RX ORDER — ACETAMINOPHEN 325 MG/1
650 TABLET ORAL EVERY 4 HOURS PRN
Status: DISCONTINUED | OUTPATIENT
Start: 2025-06-09 | End: 2025-06-09 | Stop reason: HOSPADM

## 2025-06-09 RX ORDER — PROPOFOL 10 MG/ML
INJECTION, EMULSION INTRAVENOUS AS NEEDED
Status: DISCONTINUED | OUTPATIENT
Start: 2025-06-09 | End: 2025-06-09

## 2025-06-09 RX ORDER — CEFAZOLIN 1 G/1
INJECTION, POWDER, FOR SOLUTION INTRAVENOUS AS NEEDED
Status: DISCONTINUED | OUTPATIENT
Start: 2025-06-09 | End: 2025-06-09

## 2025-06-09 RX ORDER — ACETAMINOPHEN 160 MG/5ML
650 SOLUTION ORAL EVERY 6 HOURS
Status: DISCONTINUED | OUTPATIENT
Start: 2025-06-09 | End: 2025-06-10 | Stop reason: HOSPADM

## 2025-06-09 RX ORDER — PHENYLEPHRINE HCL IN 0.9% NACL 0.4MG/10ML
SYRINGE (ML) INTRAVENOUS AS NEEDED
Status: DISCONTINUED | OUTPATIENT
Start: 2025-06-09 | End: 2025-06-09

## 2025-06-09 RX ORDER — LIDOCAINE 560 MG/1
1 PATCH PERCUTANEOUS; TOPICAL; TRANSDERMAL DAILY
Status: DISCONTINUED | OUTPATIENT
Start: 2025-06-09 | End: 2025-06-10 | Stop reason: HOSPADM

## 2025-06-09 RX ORDER — ALBUMIN HUMAN 50 G/1000ML
SOLUTION INTRAVENOUS AS NEEDED
Status: DISCONTINUED | OUTPATIENT
Start: 2025-06-09 | End: 2025-06-09

## 2025-06-09 RX ORDER — ONDANSETRON HYDROCHLORIDE 2 MG/ML
4 INJECTION, SOLUTION INTRAVENOUS EVERY 8 HOURS PRN
Status: DISCONTINUED | OUTPATIENT
Start: 2025-06-09 | End: 2025-06-10 | Stop reason: HOSPADM

## 2025-06-09 RX ORDER — SUCCINYLCHOLINE CHLORIDE 100 MG/5ML
SYRINGE (ML) INTRAVENOUS AS NEEDED
Status: DISCONTINUED | OUTPATIENT
Start: 2025-06-09 | End: 2025-06-09

## 2025-06-09 RX ORDER — FENTANYL CITRATE 50 UG/ML
INJECTION, SOLUTION INTRAMUSCULAR; INTRAVENOUS AS NEEDED
Status: DISCONTINUED | OUTPATIENT
Start: 2025-06-09 | End: 2025-06-09

## 2025-06-09 RX ADMIN — PROPOFOL 200 MG: 10 INJECTION, EMULSION INTRAVENOUS at 12:13

## 2025-06-09 RX ADMIN — EPHEDRINE SULFATE 5 MG: 50 INJECTION INTRAVENOUS at 14:09

## 2025-06-09 RX ADMIN — Medication 80 MCG: at 12:45

## 2025-06-09 RX ADMIN — SUGAMMADEX 200 MG: 100 INJECTION, SOLUTION INTRAVENOUS at 13:36

## 2025-06-09 RX ADMIN — ONDANSETRON 4 MG: 2 INJECTION, SOLUTION INTRAMUSCULAR; INTRAVENOUS at 13:36

## 2025-06-09 RX ADMIN — ALBUMIN HUMAN 250 ML: 0.05 INJECTION, SOLUTION INTRAVENOUS at 13:04

## 2025-06-09 RX ADMIN — OXYCODONE 5 MG: 5 TABLET ORAL at 23:56

## 2025-06-09 RX ADMIN — HYDROMORPHONE HYDROCHLORIDE 0.2 MG: 0.2 INJECTION, SOLUTION INTRAMUSCULAR; INTRAVENOUS; SUBCUTANEOUS at 14:36

## 2025-06-09 RX ADMIN — SODIUM CHLORIDE, SODIUM LACTATE, POTASSIUM CHLORIDE, AND CALCIUM CHLORIDE: 600; 310; 30; 20 INJECTION, SOLUTION INTRAVENOUS at 12:07

## 2025-06-09 RX ADMIN — FENTANYL CITRATE 50 MCG: 50 INJECTION, SOLUTION INTRAMUSCULAR; INTRAVENOUS at 12:13

## 2025-06-09 RX ADMIN — ONDANSETRON 4 MG: 2 INJECTION INTRAMUSCULAR; INTRAVENOUS at 19:23

## 2025-06-09 RX ADMIN — ROCURONIUM BROMIDE 50 MG: 10 INJECTION INTRAVENOUS at 12:21

## 2025-06-09 RX ADMIN — LIDOCAINE 4% 1 PATCH: 40 PATCH TOPICAL at 21:00

## 2025-06-09 RX ADMIN — CEFAZOLIN 2 G: 1 INJECTION, POWDER, FOR SOLUTION INTRAMUSCULAR; INTRAVENOUS at 12:16

## 2025-06-09 RX ADMIN — HYDROMORPHONE HYDROCHLORIDE 0.2 MG: 1 INJECTION, SOLUTION INTRAMUSCULAR; INTRAVENOUS; SUBCUTANEOUS at 13:38

## 2025-06-09 RX ADMIN — PROPOFOL 30 MG: 10 INJECTION, EMULSION INTRAVENOUS at 12:39

## 2025-06-09 RX ADMIN — Medication 140 MG: at 12:13

## 2025-06-09 RX ADMIN — EPHEDRINE SULFATE 10 MG: 50 INJECTION INTRAVENOUS at 12:20

## 2025-06-09 RX ADMIN — GABAPENTIN 300 MG: 300 CAPSULE ORAL at 10:28

## 2025-06-09 RX ADMIN — PROPOFOL 30 MG: 10 INJECTION, EMULSION INTRAVENOUS at 13:27

## 2025-06-09 RX ADMIN — FENTANYL CITRATE 50 MCG: 50 INJECTION, SOLUTION INTRAMUSCULAR; INTRAVENOUS at 12:32

## 2025-06-09 RX ADMIN — HYDROMORPHONE HYDROCHLORIDE 0.5 MG: 1 INJECTION, SOLUTION INTRAMUSCULAR; INTRAVENOUS; SUBCUTANEOUS at 15:10

## 2025-06-09 RX ADMIN — EPHEDRINE SULFATE 5 MG: 50 INJECTION INTRAVENOUS at 13:41

## 2025-06-09 RX ADMIN — HYDROMORPHONE HYDROCHLORIDE 0.2 MG: 1 INJECTION, SOLUTION INTRAMUSCULAR; INTRAVENOUS; SUBCUTANEOUS at 13:36

## 2025-06-09 RX ADMIN — Medication 120 MCG: at 13:08

## 2025-06-09 RX ADMIN — SODIUM CHLORIDE, SODIUM LACTATE, POTASSIUM CHLORIDE, AND CALCIUM CHLORIDE: 600; 310; 30; 20 INJECTION, SOLUTION INTRAVENOUS at 13:31

## 2025-06-09 RX ADMIN — METHOCARBAMOL 500 MG: 500 TABLET ORAL at 21:00

## 2025-06-09 RX ADMIN — METHOCARBAMOL 1000 MG: 1000 INJECTION, SOLUTION INTRAMUSCULAR; INTRAVENOUS at 15:56

## 2025-06-09 RX ADMIN — SODIUM CHLORIDE, SODIUM LACTATE, POTASSIUM CHLORIDE, AND CALCIUM CHLORIDE 50 ML/HR: .6; .31; .03; .02 INJECTION, SOLUTION INTRAVENOUS at 19:27

## 2025-06-09 RX ADMIN — ENOXAPARIN SODIUM 40 MG: 100 INJECTION SUBCUTANEOUS at 21:02

## 2025-06-09 RX ADMIN — CELECOXIB 200 MG: 200 CAPSULE ORAL at 10:28

## 2025-06-09 RX ADMIN — EPHEDRINE SULFATE 5 MG: 50 INJECTION INTRAVENOUS at 13:53

## 2025-06-09 RX ADMIN — EPHEDRINE SULFATE 5 MG: 50 INJECTION INTRAVENOUS at 13:59

## 2025-06-09 RX ADMIN — ACETAMINOPHEN 975 MG: 325 TABLET ORAL at 10:24

## 2025-06-09 RX ADMIN — LIDOCAINE HYDROCHLORIDE 80 MG: 20 INJECTION, SOLUTION INFILTRATION; PERINEURAL at 12:12

## 2025-06-09 RX ADMIN — HYDROMORPHONE HYDROCHLORIDE 0.5 MG: 1 INJECTION, SOLUTION INTRAMUSCULAR; INTRAVENOUS; SUBCUTANEOUS at 14:49

## 2025-06-09 RX ADMIN — ACETAMINOPHEN 650 MG: 160 SOLUTION ORAL at 21:00

## 2025-06-09 SDOH — SOCIAL STABILITY: SOCIAL INSECURITY
WITHIN THE LAST YEAR, HAVE YOU BEEN KICKED, HIT, SLAPPED, OR OTHERWISE PHYSICALLY HURT BY YOUR PARTNER OR EX-PARTNER?: NO

## 2025-06-09 SDOH — SOCIAL STABILITY: SOCIAL INSECURITY
WITHIN THE LAST YEAR, HAVE YOU BEEN RAPED OR FORCED TO HAVE ANY KIND OF SEXUAL ACTIVITY BY YOUR PARTNER OR EX-PARTNER?: NO

## 2025-06-09 SDOH — SOCIAL STABILITY: SOCIAL INSECURITY: HAS ANYONE EVER THREATENED TO HURT YOUR FAMILY OR YOUR PETS?: NO

## 2025-06-09 SDOH — SOCIAL STABILITY: SOCIAL INSECURITY: DO YOU FEEL ANYONE HAS EXPLOITED OR TAKEN ADVANTAGE OF YOU FINANCIALLY OR OF YOUR PERSONAL PROPERTY?: NO

## 2025-06-09 SDOH — SOCIAL STABILITY: SOCIAL INSECURITY: DO YOU FEEL UNSAFE GOING BACK TO THE PLACE WHERE YOU ARE LIVING?: NO

## 2025-06-09 SDOH — ECONOMIC STABILITY: INCOME INSECURITY: IN THE PAST 12 MONTHS HAS THE ELECTRIC, GAS, OIL, OR WATER COMPANY THREATENED TO SHUT OFF SERVICES IN YOUR HOME?: NO

## 2025-06-09 SDOH — ECONOMIC STABILITY: HOUSING INSECURITY: IN THE PAST 12 MONTHS, HOW MANY TIMES HAVE YOU MOVED WHERE YOU WERE LIVING?: 0

## 2025-06-09 SDOH — SOCIAL STABILITY: SOCIAL INSECURITY: ARE THERE ANY APPARENT SIGNS OF INJURIES/BEHAVIORS THAT COULD BE RELATED TO ABUSE/NEGLECT?: NO

## 2025-06-09 SDOH — ECONOMIC STABILITY: FOOD INSECURITY: WITHIN THE PAST 12 MONTHS, YOU WORRIED THAT YOUR FOOD WOULD RUN OUT BEFORE YOU GOT THE MONEY TO BUY MORE.: NEVER TRUE

## 2025-06-09 SDOH — SOCIAL STABILITY: SOCIAL INSECURITY: WERE YOU ABLE TO COMPLETE ALL THE BEHAVIORAL HEALTH SCREENINGS?: YES

## 2025-06-09 SDOH — SOCIAL STABILITY: SOCIAL INSECURITY: ABUSE: ADULT

## 2025-06-09 SDOH — SOCIAL STABILITY: SOCIAL INSECURITY
ASK PARENT OR GUARDIAN: ARE THERE TIMES WHEN YOU, YOUR CHILD(REN), OR ANY MEMBER OF YOUR HOUSEHOLD FEEL UNSAFE, HARMED, OR THREATENED AROUND PERSONS WITH WHOM YOU KNOW OR LIVE?: NO

## 2025-06-09 SDOH — SOCIAL STABILITY: SOCIAL INSECURITY: DOES ANYONE TRY TO KEEP YOU FROM HAVING/CONTACTING OTHER FRIENDS OR DOING THINGS OUTSIDE YOUR HOME?: NO

## 2025-06-09 SDOH — SOCIAL STABILITY: SOCIAL INSECURITY: WITHIN THE LAST YEAR, HAVE YOU BEEN AFRAID OF YOUR PARTNER OR EX-PARTNER?: NO

## 2025-06-09 SDOH — ECONOMIC STABILITY: FOOD INSECURITY: WITHIN THE PAST 12 MONTHS, THE FOOD YOU BOUGHT JUST DIDN'T LAST AND YOU DIDN'T HAVE MONEY TO GET MORE.: NEVER TRUE

## 2025-06-09 SDOH — SOCIAL STABILITY: SOCIAL INSECURITY: WITHIN THE LAST YEAR, HAVE YOU BEEN HUMILIATED OR EMOTIONALLY ABUSED IN OTHER WAYS BY YOUR PARTNER OR EX-PARTNER?: NO

## 2025-06-09 SDOH — SOCIAL STABILITY: SOCIAL INSECURITY: HAVE YOU HAD ANY THOUGHTS OF HARMING ANYONE ELSE?: NO

## 2025-06-09 SDOH — SOCIAL STABILITY: SOCIAL INSECURITY: ARE YOU OR HAVE YOU BEEN THREATENED OR ABUSED PHYSICALLY, EMOTIONALLY, OR SEXUALLY BY ANYONE?: NO

## 2025-06-09 SDOH — SOCIAL STABILITY: SOCIAL INSECURITY: HAVE YOU HAD THOUGHTS OF HARMING ANYONE ELSE?: NO

## 2025-06-09 ASSESSMENT — PAIN SCALES - GENERAL
PAINLEVEL_OUTOF10: 4
PAINLEVEL_OUTOF10: 4
PAINLEVEL_OUTOF10: 7
PAIN_LEVEL: 0
PAINLEVEL_OUTOF10: 6
PAINLEVEL_OUTOF10: 7
PAINLEVEL_OUTOF10: 4
PAINLEVEL_OUTOF10: 7
PAINLEVEL_OUTOF10: 3

## 2025-06-09 ASSESSMENT — PATIENT HEALTH QUESTIONNAIRE - PHQ9
SUM OF ALL RESPONSES TO PHQ9 QUESTIONS 1 & 2: 0
2. FEELING DOWN, DEPRESSED OR HOPELESS: NOT AT ALL
1. LITTLE INTEREST OR PLEASURE IN DOING THINGS: NOT AT ALL

## 2025-06-09 ASSESSMENT — PAIN - FUNCTIONAL ASSESSMENT
PAIN_FUNCTIONAL_ASSESSMENT: UNABLE TO SELF-REPORT
PAIN_FUNCTIONAL_ASSESSMENT: UNABLE TO SELF-REPORT
PAIN_FUNCTIONAL_ASSESSMENT: 0-10
PAIN_FUNCTIONAL_ASSESSMENT: 0-10
PAIN_FUNCTIONAL_ASSESSMENT: UNABLE TO SELF-REPORT
PAIN_FUNCTIONAL_ASSESSMENT: 0-10
PAIN_FUNCTIONAL_ASSESSMENT: UNABLE TO SELF-REPORT
PAIN_FUNCTIONAL_ASSESSMENT: 0-10
PAIN_FUNCTIONAL_ASSESSMENT: UNABLE TO SELF-REPORT
PAIN_FUNCTIONAL_ASSESSMENT: 0-10
PAIN_FUNCTIONAL_ASSESSMENT: UNABLE TO SELF-REPORT
PAIN_FUNCTIONAL_ASSESSMENT: 0-10

## 2025-06-09 ASSESSMENT — PAIN DESCRIPTION - DESCRIPTORS: DESCRIPTORS: ACHING

## 2025-06-09 ASSESSMENT — ACTIVITIES OF DAILY LIVING (ADL)
BATHING: INDEPENDENT
HEARING - RIGHT EAR: DIFFICULTY WITH NOISE
DRESSING YOURSELF: NEEDS ASSISTANCE
HEARING - LEFT EAR: DIFFICULTY WITH NOISE
WALKS IN HOME: INDEPENDENT
TOILETING: INDEPENDENT
LACK_OF_TRANSPORTATION: NO
FEEDING YOURSELF: INDEPENDENT
JUDGMENT_ADEQUATE_SAFELY_COMPLETE_DAILY_ACTIVITIES: YES
PATIENT'S MEMORY ADEQUATE TO SAFELY COMPLETE DAILY ACTIVITIES?: YES
ADEQUATE_TO_COMPLETE_ADL: YES
GROOMING: INDEPENDENT

## 2025-06-09 ASSESSMENT — COLUMBIA-SUICIDE SEVERITY RATING SCALE - C-SSRS
6. HAVE YOU EVER DONE ANYTHING, STARTED TO DO ANYTHING, OR PREPARED TO DO ANYTHING TO END YOUR LIFE?: NO
1. IN THE PAST MONTH, HAVE YOU WISHED YOU WERE DEAD OR WISHED YOU COULD GO TO SLEEP AND NOT WAKE UP?: NO
2. HAVE YOU ACTUALLY HAD ANY THOUGHTS OF KILLING YOURSELF?: NO

## 2025-06-09 ASSESSMENT — LIFESTYLE VARIABLES
SKIP TO QUESTIONS 9-10: 1
HOW OFTEN DO YOU HAVE A DRINK CONTAINING ALCOHOL: NEVER
HOW MANY STANDARD DRINKS CONTAINING ALCOHOL DO YOU HAVE ON A TYPICAL DAY: PATIENT DOES NOT DRINK
HOW OFTEN DO YOU HAVE 6 OR MORE DRINKS ON ONE OCCASION: NEVER
AUDIT-C TOTAL SCORE: 0
AUDIT-C TOTAL SCORE: 0

## 2025-06-09 ASSESSMENT — COGNITIVE AND FUNCTIONAL STATUS - GENERAL
PATIENT BASELINE BEDBOUND: NO
DAILY ACTIVITIY SCORE: 24
MOBILITY SCORE: 24

## 2025-06-09 ASSESSMENT — PAIN DESCRIPTION - LOCATION
LOCATION: ABDOMEN

## 2025-06-09 NOTE — ANESTHESIA PREPROCEDURE EVALUATION
"Patient: Suri Wray    Procedure Information       Date/Time: 06/09/25 1030    Procedure: REPAIR, HERNIA, DIAPHRAGMATIC, LAPAROSCOPIC (Abdomen)    Location: [facility] OR 01 / Virtual [facility] OR    Surgeons: Asif Hooks MD            Relevant Problems   Cardiac   (+) Atypical chest pain      Neuro   (+) Chronic lumbar radiculopathy      GI   (+) Esophageal reflux   (+) Hiatal hernia      Endocrine   (+) Hypothyroidism      Musculoskeletal   (+) Scoliosis       Chemistry    Lab Results   Component Value Date/Time     03/04/2024 1008    K 4.5 03/04/2024 1008     03/04/2024 1008    CO2 26 03/04/2024 1008    BUN 17 03/04/2024 1008    CREATININE 0.80 03/04/2024 1008    Lab Results   Component Value Date/Time    CALCIUM 9.6 03/04/2024 1008    ALKPHOS 98 03/04/2024 1008    AST 14 03/04/2024 1008    ALT 13 03/04/2024 1008    BILITOT 0.4 03/04/2024 1008          Lab Results   Component Value Date/Time    WBC 8.8 03/04/2024 1008    HGB 12.6 03/04/2024 1008    HCT 38.7 03/04/2024 1008     03/04/2024 1008     No results found for: \"PROTIME\", \"PTT\", \"INR\"  No results found for this or any previous visit (from the past 4464 hours).  No results found for this or any previous visit from the past 1095 days.    Clinical information reviewed:    Allergies                NPO Detail:  No data recorded     Physical Exam    Airway  Mallampati: III  TM distance: >3 FB  Neck ROM: full  Mouth opening: 3 or more finger widths     Cardiovascular    Dental        Pulmonary    Abdominal      Other findings: Notified the patient ,there is a risk of losing this loose tooth         Anesthesia Plan    History of general anesthesia?: yes  History of complications of general anesthesia?: no    ASA 3     general   (rsi)  intravenous induction   Postoperative pain plan includes opioids.  Trial extubation is planned.  Anesthetic plan and risks discussed with patient.  Use of blood products discussed with patient who.  "   Plan discussed with resident.

## 2025-06-09 NOTE — ANESTHESIA PROCEDURE NOTES
Airway  Date/Time: 6/9/2025 12:14 PM  Reason: elective    Airway not difficult    Staffing  Performed: KYLAH   Authorized by: Nathaly Perez MD    Performed by: KYLAH Keane  Patient location during procedure: OR    Patient Condition  Indications for airway management: anesthesia  Patient position: sniffing  MILS maintained throughout  Planned trial extubation  Sedation level: deep     Final Airway Details   Preoxygenated: yes  Final airway type: endotracheal airway  Successful airway: ETT  Cuffed: yes   Successful intubation technique: video laryngoscopy  Adjuncts used in placement: intubating stylet and cricoid pressure  Endotracheal tube insertion site: oral  Blade size: #3  ETT size (mm): 7.0  Cormack-Lehane Classification: grade I - full view of glottis  Placement verified by: chest auscultation and capnometry   Measured from: teeth  ETT to teeth (cm): 21  Number of attempts at approach: 1  Number of other approaches attempted: 0    Additional Comments  RSI

## 2025-06-09 NOTE — H&P
History Of Present Illness  Suri Wray is a 75 y.o. female presenting with large symptomatic paraesophageal hernia. Has some mild esopagheal dysmotility on barium swallow. Has scolisosis.  Previous open cholecystectomy .     Past Medical History  She has a past medical history of Abdominal pain, RUQ (right upper quadrant) (04/27/2023), Acute upper respiratory infection, unspecified (09/09/2016), Fever, unspecified (04/01/2019), Other conditions influencing health status (10/23/2013), Other conditions influencing health status (09/13/2016), Other conditions influencing health status (06/29/2017), Personal history of other diseases of the respiratory system (09/13/2016), Personal history of other diseases of the respiratory system (07/07/2018), Personal history of other diseases of the respiratory system (09/29/2014), Personal history of other specified conditions, Personal history of other specified conditions (04/14/2018), and Trochanteric bursitis, left hip (06/29/2017).    Surgical History  She has a past surgical history that includes Colonoscopy (09/04/2015) and Other surgical history (09/04/2015).     Social History  She reports that she has never smoked. She has never used smokeless tobacco. She reports current alcohol use. She reports that she does not use drugs.    Family History  Family History[1]     Allergies  Codeine    Review of Systems     Physical Exam  Awake and Alert  Normal respiration  Abdomen scars from previous surgery     Last Recorded Vitals  There were no vitals taken for this visit.    Relevant Results  Reviewed barium swallow and CT scan     Assessment/Plan   Assessment & Plan  Hiatal hernia      Plan on laparoscopic hiatel hernia repair without anti-relflux operation       Asif Hooks MD         [1] No family history on file.

## 2025-06-09 NOTE — ANESTHESIA POSTPROCEDURE EVALUATION
Patient: Suri Wray    Procedure Summary       Date: 06/09/25 Room / Location: [facility] OR 01 / [facility] OR    Anesthesia Start: 1206 Anesthesia Stop: 1430    Procedure: REPAIR, HERNIA, DIAPHRAGMATIC, LAPAROSCOPIC (Abdomen) Diagnosis:       Hiatal hernia      (Hiatal hernia [K44.9])    Surgeons: Asif Hooks MD Responsible Provider: Nathaly Perez MD    Anesthesia Type: general ASA Status: 3            Anesthesia Type: general    Vitals Value Taken Time   /69 06/09/25 14:25   Temp 36.1 06/09/25 14:30   Pulse 76 06/09/25 14:26   Resp 13 06/09/25 14:26   SpO2 100 % 06/09/25 14:26   Vitals shown include unfiled device data.    Anesthesia Post Evaluation    Patient location during evaluation: PACU  Patient participation: complete - patient participated  Level of consciousness: sleepy but conscious  Pain score: 0  Pain management: adequate  Airway patency: patent  Cardiovascular status: acceptable, blood pressure returned to baseline and hemodynamically stable  Respiratory status: face mask  Hydration status: acceptable  Postoperative Nausea and Vomiting: none        No notable events documented.

## 2025-06-09 NOTE — OP NOTE
REPAIR, HERNIA, DIAPHRAGMATIC, LAPAROSCOPIC Operative Note     Date: 2025  OR Location: [facility] OR    Name: Suri Wray, : 1950, Age: 75 y.o., MRN: [id number], Sex: female    Diagnosis  Pre-op Diagnosis      * Hiatal hernia [K44.9] Post-op Diagnosis     * Hiatal hernia [K44.9]     Procedures  REPAIR, HERNIA, DIAPHRAGMATIC, LAPAROSCOPIC  30029 - IA LAPS RPR PARAESPHGL HRNA INCL FUNDPLSTY W/O MESH      Surgeons      * Asif Hooks - Primary    Resident/Fellow/Other Assistant:  Surgeons and Role:     * Qasim Burris MD - Fellow    Staff:   Circulator: Thuy  Scrub Person: Daysi Spenceub Person: Sindhu Ng Circulator: Griselda    Anesthesia Staff: Anesthesiologist: Nathaly Perez MD  C-AA: KYLAH Keane  Anesthesia Resident: Jeremy Francisco MD; Ramesh Joseph MD    Procedure Summary  Anesthesia: General  ASA: III  Estimated Blood Loss: 5mL  Intra-op Medications:   Administrations occurring from 1030 to 1240 on 25:   Medication Name Total Dose   surgical lubricant gel 1 Application   sodium chloride 0.9 % irrigation solution 1,000 mL   ceFAZolin (Ancef) vial 1 g 2 g   dexmedeTOMIDine (Precedex) bolus from bag 16 mcg   ePHEDrine injection 10 mg   fentaNYL (Sublimaze) injection 50 mcg/mL 100 mcg   LR bolus Cannot be calculated   lidocaine (Xylocaine) injection 2 % 80 mg   propofol (Diprivan) injection 10 mg/mL 230 mg   rocuronium (ZeMuron) 50 mg/5 mL injection 50 mg   succinylcholine 100 mg/5 mL SYRINGE 140 mg              Anesthesia Record               Intraprocedure I/O Totals          Intake    Dexmedetomidine 0.00 mL    The total shown is the total volume documented since Anesthesia Start was filed.    LR bolus 1000.00 mL    albumin human bottle 5% 250.00 mL    Total Intake 1250 mL          Specimen: No specimens collected              Drains and/or Catheters: * None in log *    Tourniquet Times:         Implants:     Findings: large paraesophageal  hernia    Indications: Suri Wray is an 75 y.o. female who is having surgery for Hiatal hernia [K44.9].     The patient was seen in the preoperative area. The risks, benefits, complications, treatment options, non-operative alternatives, expected recovery and outcomes were discussed with the patient. The possibilities of reaction to medication, pulmonary aspiration, injury to surrounding structures, bleeding, recurrent infection, the need for additional procedures, failure to diagnose a condition, and creating a complication requiring transfusion or operation were discussed with the patient. The patient concurred with the proposed plan, giving informed consent.  The site of surgery was properly noted/marked if necessary per policy. The patient has been actively warmed in preoperative area. Preoperative antibiotics have been ordered and given within 1 hours of incision. Venous thrombosis prophylaxis have been ordered including bilateral sequential compression devices    Procedure Details: Patient brought to the op room general endotracheal anesthesia was performed patient had was prepped and draped.  Using a supraumbilical transverse incision and the Meyers technique as a was entered and insufflated.  I could see she had omental adhesions across her upper abdomen from her previous open cholecystectomy.  I then placed a trocar in the left lateral subcostal area.  I used the harmonic scalpel to take down these adhesions.  This allowed me to take place the remainder of my trocars.  There is total of 4 subcostal trocars.  The liver retractor was placed.  There is a large hiatal hernia.  Began taking down the phrenoesophageal ligament which allowed us to reduce the hernia posteriorly.  We then followed the right arturo to the left arturo and then went anteriorly and across this allowed us to repair this the entire hernia sac with the stomach.  I then mobilized the esophagus.  Very nice intra-abdominal esophagus.  We  then closed the posterior hiatus with interrupted oh nonabsorbable suture.  The patient did have esophageal dysmotility and our discussion was that we would fix her large hiatal hernia bring intra-abdominal esophagus as part of her antireflux.  I would not do a wrap because of her dysmotility.  I passed a video endoscope down.  On retroflexion could see that nice and predominately esophagus.  Almost looking like a wrap.  I then did a pexied the stomach to the anterior abdominal wall using 0 suture and the Endo Close.  Again there is no other abnormality seen.  I then remove my video endoscope.  The endoscope was needed to make sure that there were no mucosal abnormalities which were none.  Remove my trocars.  Closed the fascial defect from the Meyers with 0 Vicryl and skin with 4-0 Vicryl.  Evidence of Infection: No   Complications:  None; patient tolerated the procedure well.    Disposition: PACU - hemodynamically stable.  Condition: stable           Attending Attestation: I was present and scrubbed for the entire procedure.    Asif Hooks  Phone Number: [phone]

## 2025-06-10 ENCOUNTER — APPOINTMENT (OUTPATIENT)
Dept: CARDIOLOGY | Facility: HOSPITAL | Age: 75
End: 2025-06-10
Payer: MEDICARE

## 2025-06-10 VITALS
HEART RATE: 57 BPM | WEIGHT: 165.79 LBS | SYSTOLIC BLOOD PRESSURE: 133 MMHG | TEMPERATURE: 97.4 F | BODY MASS INDEX: 31.3 KG/M2 | RESPIRATION RATE: 18 BRPM | DIASTOLIC BLOOD PRESSURE: 81 MMHG | HEIGHT: 61 IN | OXYGEN SATURATION: 95 %

## 2025-06-10 LAB
ALBUMIN SERPL BCP-MCNC: 3.8 G/DL (ref 3.4–5)
ANION GAP SERPL CALC-SCNC: 14 MMOL/L (ref 10–20)
BUN SERPL-MCNC: 14 MG/DL (ref 6–23)
CALCIUM SERPL-MCNC: 8.8 MG/DL (ref 8.6–10.6)
CHLORIDE SERPL-SCNC: 100 MMOL/L (ref 98–107)
CO2 SERPL-SCNC: 23 MMOL/L (ref 21–32)
CREAT SERPL-MCNC: 0.58 MG/DL (ref 0.5–1.05)
EGFRCR SERPLBLD CKD-EPI 2021: >90 ML/MIN/1.73M*2
ERYTHROCYTE [DISTWIDTH] IN BLOOD BY AUTOMATED COUNT: 13.2 % (ref 11.5–14.5)
GLUCOSE SERPL-MCNC: 99 MG/DL (ref 74–99)
HCT VFR BLD AUTO: 36.6 % (ref 36–46)
HGB BLD-MCNC: 11.7 G/DL (ref 12–16)
MCH RBC QN AUTO: 30.3 PG (ref 26–34)
MCHC RBC AUTO-ENTMCNC: 32 G/DL (ref 32–36)
MCV RBC AUTO: 95 FL (ref 80–100)
NRBC BLD-RTO: 0 /100 WBCS (ref 0–0)
PHOSPHATE SERPL-MCNC: 3.3 MG/DL (ref 2.5–4.9)
PLATELET # BLD AUTO: 261 X10*3/UL (ref 150–450)
POTASSIUM SERPL-SCNC: 4.3 MMOL/L (ref 3.5–5.3)
RBC # BLD AUTO: 3.86 X10*6/UL (ref 4–5.2)
SODIUM SERPL-SCNC: 133 MMOL/L (ref 136–145)
WBC # BLD AUTO: 12.4 X10*3/UL (ref 4.4–11.3)

## 2025-06-10 PROCEDURE — 2500000002 HC RX 250 W HCPCS SELF ADMINISTERED DRUGS (ALT 637 FOR MEDICARE OP, ALT 636 FOR OP/ED): Performed by: STUDENT IN AN ORGANIZED HEALTH CARE EDUCATION/TRAINING PROGRAM

## 2025-06-10 PROCEDURE — 2500000001 HC RX 250 WO HCPCS SELF ADMINISTERED DRUGS (ALT 637 FOR MEDICARE OP): Performed by: STUDENT IN AN ORGANIZED HEALTH CARE EDUCATION/TRAINING PROGRAM

## 2025-06-10 PROCEDURE — 36415 COLL VENOUS BLD VENIPUNCTURE: CPT | Performed by: SURGERY

## 2025-06-10 PROCEDURE — 93005 ELECTROCARDIOGRAM TRACING: CPT

## 2025-06-10 PROCEDURE — 7100000011 HC EXTENDED STAY RECOVERY HOURLY - NURSING UNIT

## 2025-06-10 PROCEDURE — 85027 COMPLETE CBC AUTOMATED: CPT | Performed by: SURGERY

## 2025-06-10 PROCEDURE — 2500000005 HC RX 250 GENERAL PHARMACY W/O HCPCS: Performed by: STUDENT IN AN ORGANIZED HEALTH CARE EDUCATION/TRAINING PROGRAM

## 2025-06-10 PROCEDURE — 99024 POSTOP FOLLOW-UP VISIT: CPT | Performed by: NURSE PRACTITIONER

## 2025-06-10 PROCEDURE — 84100 ASSAY OF PHOSPHORUS: CPT | Performed by: SURGERY

## 2025-06-10 RX ORDER — IBUPROFEN 400 MG/1
400 TABLET, FILM COATED ORAL EVERY 8 HOURS PRN
Qty: 10 TABLET | Refills: 0 | Status: SHIPPED | OUTPATIENT
Start: 2025-06-10

## 2025-06-10 RX ORDER — DOCUSATE SODIUM 100 MG/1
100 CAPSULE, LIQUID FILLED ORAL 2 TIMES DAILY PRN
Qty: 10 CAPSULE | Refills: 0 | Status: SHIPPED | OUTPATIENT
Start: 2025-06-10

## 2025-06-10 RX ORDER — OXYCODONE HYDROCHLORIDE 5 MG/1
5 TABLET ORAL EVERY 6 HOURS PRN
Qty: 6 TABLET | Refills: 0 | Status: SHIPPED | OUTPATIENT
Start: 2025-06-10

## 2025-06-10 RX ORDER — ACETAMINOPHEN 325 MG/1
650 TABLET ORAL EVERY 6 HOURS PRN
Qty: 30 TABLET | Refills: 0 | Status: SHIPPED | OUTPATIENT
Start: 2025-06-10

## 2025-06-10 RX ORDER — CHOLECALCIFEROL (VITAMIN D3) 50 MCG
50 TABLET ORAL DAILY
COMMUNITY

## 2025-06-10 RX ADMIN — IBUPROFEN 400 MG: 400 TABLET ORAL at 02:47

## 2025-06-10 RX ADMIN — ACETAMINOPHEN 650 MG: 160 SOLUTION ORAL at 06:21

## 2025-06-10 RX ADMIN — LIDOCAINE 4% 1 PATCH: 40 PATCH TOPICAL at 09:30

## 2025-06-10 RX ADMIN — PANTOPRAZOLE SODIUM 40 MG: 40 TABLET, DELAYED RELEASE ORAL at 06:21

## 2025-06-10 RX ADMIN — LEVOTHYROXINE SODIUM 150 MCG: 0.07 TABLET ORAL at 09:27

## 2025-06-10 RX ADMIN — OXYCODONE 5 MG: 5 TABLET ORAL at 09:36

## 2025-06-10 RX ADMIN — METHOCARBAMOL 500 MG: 500 TABLET ORAL at 06:21

## 2025-06-10 ASSESSMENT — PAIN - FUNCTIONAL ASSESSMENT
PAIN_FUNCTIONAL_ASSESSMENT: 0-10

## 2025-06-10 ASSESSMENT — COGNITIVE AND FUNCTIONAL STATUS - GENERAL
DAILY ACTIVITIY SCORE: 24
MOBILITY SCORE: 24

## 2025-06-10 ASSESSMENT — PAIN SCALES - WONG BAKER: WONGBAKER_NUMERICALRESPONSE: NO HURT

## 2025-06-10 ASSESSMENT — PAIN SCALES - GENERAL
PAINLEVEL_OUTOF10: 4
PAINLEVEL_OUTOF10: 7
PAINLEVEL_OUTOF10: 0 - NO PAIN
PAINLEVEL_OUTOF10: 6
PAINLEVEL_OUTOF10: 5 - MODERATE PAIN

## 2025-06-10 NOTE — SIGNIFICANT EVENT
POST OP CHECK NOTE    Subjective  Suri Wray is a  75 y.o. female now POD 0 s/p lap paraesophageal hernia repair. Pt tolerated the procedure well and was extubated prior to being brought to PACU    Pain is well controlled (3/10) on current pain regimen. No complaints of headaches, N/V. Reported chest pain to nursing, obtained EKG which was normal. Patient reporting chest pain and points to surgical site. Nonradiating, no SOB, described as sharp, denies squeezing feeling, heart fluttering. Tolerating CLD, sipping small amounts.    Drains none  Campuzano none  Drips LR 50    Objective  Vitals:    06/09/25 2121   BP: (!) 159/97   Pulse: 60   Resp: 18   Temp: 35 °C (95 °F)   SpO2: 94%       Physical Exam:  General: laying bed, in NAD  CV/Resp: regular rate, breathing comfortably on 2L NC, no accessory muscle use  Abdomen/GI: Soft, nondistended, ATTP near lap sites. Lap sites dermabond intact. Lidocaine patch on.  Neuro: MAEX4    Assessment/Plan:  Suri Wray is a  75 y.o. female now POD 0 s/p lap paraesophageal hernia repair. Recovering well, chest pain as expected postoperatively with normal EKG normal hemodynamics.    - Multimodal pain control   - Diet: Adult diet Clear Liquid  - Morning labs: ordered  -Q4 vitals checks no tele  -Campuzano none  -strict I/Os  -DVT Ppx: lovenox subQ + SCDs     Will continue to monitor overnight.     Rebeca Sellers MD   PGY1 General Surgery  [id number]

## 2025-06-10 NOTE — CONSULTS
"Nutrition Initial Assessment:   Nutrition Assessment    Reason for Assessment: Admission nursing screening - MST 1, nursing comment, \"loosing weight and unsure why\"    Patient is a 75 y.o. female s/p lap paraesophageal hernia repair. Pt in extended recovery post op.        Past Medical History:  Diagnosis Date    Abdominal pain, RUQ (right upper quadrant) 04/27/2023    Acute upper respiratory infection, unspecified 09/09/2016    URTI (acute upper respiratory infection)    Fever, unspecified 04/01/2019    Elevated temperature    Other conditions influencing health status 10/23/2013    Sprain Of Left Side Of Back    Other conditions influencing health status 09/13/2016    History of cough    Other conditions influencing health status 06/29/2017    Body mass index (BMI) of 30.0 to 39.9    Personal history of other diseases of the respiratory system 09/13/2016    History of acute sinusitis    Personal history of other diseases of the respiratory system 07/07/2018    History of acute sinusitis    Personal history of other diseases of the respiratory system 09/29/2014    Personal history of acute sinusitis    Personal history of other specified conditions     History of diarrhea    Personal history of other specified conditions 04/14/2018    History of diarrhea    Trochanteric bursitis, left hip 06/29/2017    Greater trochanteric bursitis, left      Nutrition History:  Food and Nutrient History: Pt reports her intake has been decreased within the past year d/t pain, states she has been eating 3 meals/day but they are smaller portions and will have fruit in between meals. Pt reports her baseline is medium-large portions of 3 meals/day. Pt endorsed a 15-20 lb weight loss x 1 year (10.8%). Pt reports being satisified with the weight loss, but it was unintentional. Pt amenable to try Ensure Plus vanilla.  Vitamin/Herbal Supplement Use: Vit D per pt  Food Allergy:  (None per pt)       Anthropometrics:  Height: 154.9 cm (5' 1\") "   Weight: 75.2 kg (165 lb 12.6 oz)   BMI (Calculated): 31.34  IBW/kg (Dietitian Calculated): 47.7 kg  Percent of IBW: 157 %  Adjusted Body Weight (kg): 54.6 kg      Weight History:   Wt Readings from Last 5 Encounters:   06/09/25 75.2 kg (165 lb 12.6 oz)   02/18/25 77.6 kg (171 lb)   11/26/24 79.9 kg (176 lb 3.2 oz)   08/15/24 80.2 kg (176 lb 12.8 oz)   02/28/24 79.7 kg (175 lb 12.8 oz)       Weight Change %:  Weight History / % Weight Change: Per chart, pt with a 5.9% weight loss x 7 months  Significant Weight Loss: No    Nutrition Focused Physical Exam Findings:    Subcutaneous Fat Loss:   Orbital Fat Pads: Well nourished (slightly bulging fat pads)  Buccal Fat Pads: Well nourished (full, rounded cheeks)  Triceps: Well nourished (ample fat tissue)  Muscle Wasting:  Temporalis: Well nourished (well-defined muscle)  Pectoralis (Clavicular Region): Well nourished (clavicle not visible)  Deltoid/Trapezius: Well nourished (rounded appearance at arm, shoulder, neck)  Interosseous: Mild-Moderate (slightly depressed area between thumb and forefinger)  Quadriceps: Well nourished (well developed, well rounded)  Gastrocnemius: Well nourished (well developed bulbous muscle)  Edema:  Edema: none  Physical Findings:  Skin: Positive (surgical wound to abdomen)  Digestive System Findings: Constipation  Mouth Findings:  (None per pt)    Nutrition Significant Labs:  CBC Trend:   Results from last 7 days   Lab Units 06/10/25  0617   WBC AUTO x10*3/uL 12.4*   RBC AUTO x10*6/uL 3.86*   HEMOGLOBIN g/dL 11.7*   HEMATOCRIT % 36.6   MCV fL 95   PLATELETS AUTO x10*3/uL 261    , BMP Trend:   Results from last 7 days   Lab Units 06/10/25  0617   GLUCOSE mg/dL 99   CALCIUM mg/dL 8.8   SODIUM mmol/L 133*   POTASSIUM mmol/L 4.3   CO2 mmol/L 23   CHLORIDE mmol/L 100   BUN mg/dL 14   CREATININE mg/dL 0.58    , BG POCT trend:    , Liver Function Trend:    , Renal Lab Trend:   Results from last 7 days   Lab Units 06/10/25  0617   POTASSIUM mmol/L  4.3   PHOSPHORUS mg/dL 3.3   SODIUM mmol/L 133*   EGFR mL/min/1.73m*2 >90   BUN mg/dL 14   CREATININE mg/dL 0.58       Nutrition Specific Medications:  Scheduled medications  acetaminophen, 650 mg, oral, q6h   Or  acetaminophen, 650 mg, nasogastric tube, q6h   Or  acetaminophen, 650 mg, rectal, q6h  enoxaparin, 40 mg, subcutaneous, q24h  levothyroxine, 150 mcg, oral, Daily  lidocaine, 1 patch, transdermal, Daily  methocarbamol, 500 mg, oral, q8h NILO  pantoprazole, 40 mg, oral, Daily before breakfast   Or  pantoprazole, 40 mg, intravenous, Daily before breakfast  polyethylene glycol, 17 g, oral, Daily    Continuous medications  Continuous Medications[1]  PRN medications  PRN medications: ibuprofen, ondansetron ODT **OR** ondansetron, oxyCODONE      I/O:    ;      Dietary Orders (From admission, onward)       Start     Ordered    06/10/25 0730  Adult diet Full Liquid  Diet effective now        Question:  Diet type  Answer:  Full Liquid    06/10/25 0729    06/09/25 1737  May Participate in Room Service  ( ROOM SERVICE MAY PARTICIPATE)  Once        Question:  .  Answer:  Yes    06/09/25 1736                     Estimated Needs:   Total Energy Estimated Needs in 24 hours (kCal):  (6677-9217 kcal)  Method for Estimating Needs: 30-35 kcal/kg AdjBW  Total Protein Estimated Needs in 24 Hours (g):  (65-80 g)  Method for Estimating 24 Hour Protein Needs: 1.2-1.5 g/kg AdjBW  Total Fluid Estimated Needs in 24 Hours (mL):  (1 ml/kcal or per med team)           Nutrition Diagnosis   Malnutrition Diagnosis  Patient has Malnutrition Diagnosis: No    Nutrition Diagnosis  Patient has Nutrition Diagnosis: Yes  Diagnosis Status (1): New  Nutrition Diagnosis 1: Inadequate energy intake  Related to (1): decreased intake  As Evidenced by (1): pt reports decreased intake d/t pain, 5.9% unintentional weight loss x 7 months       Nutrition Interventions/Recommendations   Nutrition prescription for oral nutrition    Nutrition  Recommendations:    Advance diet as tolerated.   Recommend MVI.    Nutrition Interventions/Goals:   Additional Interventions: Ordered Ensure Plus (350 kcal, 13 g pro) TID      Education Documentation  Discussed sources of protein with full liquid diet as well as regular solid diet  Answered pt questions regarding diet advancement          Nutrition Monitoring and Evaluation   Food/Nutrient Related History Monitoring  Monitoring and Evaluation Plan: Estimated Energy Intake  Estimated Energy Intake: Energy intake greater or equal to 75% of estimated energy needs    Anthropometric Measurements  Monitoring and Evaluation Plan: Body weight  Body Weight: Body weight - Maintain stable weight    Biochemical Data, Medical Tests and Procedures  Monitoring and Evaluation Plan: Electrolyte/renal panel, Glucose/endocrine profile  Criteria: WNL         Goal Status: New goal(s) identified    Time Spent (min): 60 minutes            [1]

## 2025-06-10 NOTE — PROGRESS NOTES
Pharmacy Medication History Review    Suri Wray is a 75 y.o. female admitted for Hiatal hernia. Pharmacy reviewed the patient's [id number] medications and allergies for accuracy.    Medications ADDED  Vitamin D-3 50 mcg  Medications CHANGED  Diclofenac 1% gel: added as needed for pain to directions  Medications REMOVED/NOT TAKING   N/A     The list below reflects the updated PTA list.   Prior to Admission Medications   Prescriptions Last Dose Informant   cholecalciferol (Vitamin D3) 50 mcg (2,000 units) tablet  Self   Sig: Take 1 tablet (50 mcg) by mouth once daily.   diclofenac sodium (Voltaren) 1 % gel  Self   Sig: Apply 4.5 inches (4 g) topically 4 times a day.   Patient taking differently: Apply 4.5 inches (4 g) topically 4 times a day as needed (pain).   dicyclomine (Bentyl) 20 mg tablet  Self   Sig: TAKE 1 TABLET (20 MG) BY MOUTH 4 TIMES A DAY AS NEEDED (CRAMPS).   esomeprazole (NexIUM) 40 mg DR capsule 6/8/2025 Self   Sig: Take 1 capsule (40 mg) by mouth once daily in the morning. Take before meals. Do not open capsule.   levothyroxine (Synthroid, Levoxyl) 150 mcg tablet 6/8/2025 Self   Sig: Take 1 tablet (150 mcg) by mouth once daily.      Facility-Administered Medications: None       The list below reflects the updated allergy list. Please review each documented allergy for additional clarification and justification.  Allergies  Reviewed by Bell Agosto RN on 6/9/2025        Severity Reactions Comments    Codeine Not Specified Itching             Patient declines M2B at discharge. Pharmacy has been updated to [facility] in [place].    Sources  [facility]  Pharmacy dispense history  Patient Interview Good historian     Additional Comments  See highlighted section in above table     Domenico Spencer, PharmD  [facility]  [id number]  [facility], [address]  [place] [address]  Phone: [phone]  Please reach out via Secure Chat for questions, or if no response call [employer] or [employer]  [facility]

## 2025-06-10 NOTE — DISCHARGE SUMMARY
Discharge Diagnosis  Hiatal hernia    Issues Requiring Follow-Up  Post-operative follow up     Test Results Pending At Discharge  Pending Labs       No current pending labs.            Hospital Course  75 year old female presented for elective laparoscopic paraesophageal hernia repair on 6/9 with Dr Hooks. Procedure was tolerated well. She was admitted post-operatively without issues. Diet was advanced from clear to full liquids without difficulty. Upon discharge, pain was controlled, tolerating full liquid diet, ambulating, and voiding without difficulty. She was discharged home with outpatient surgical follow up scheduled.     Pertinent Physical Exam At Time of Discharge  Physical Exam  Vitals reviewed.   Constitutional:       General: She is not in acute distress.  Cardiovascular:      Rate and Rhythm: Normal rate and regular rhythm.   Pulmonary:      Effort: Pulmonary effort is normal. No respiratory distress.      Comments: RA  Abdominal:      General: There is no distension.      Palpations: Abdomen is soft.      Tenderness: There is no abdominal tenderness.      Comments: Abdominal lap sites well approximated.    Skin:     General: Skin is warm and dry.   Neurological:      Mental Status: She is alert and oriented to person, place, and time.   Psychiatric:         Behavior: Behavior normal.       Home Medications     Medication List      START taking these medications     acetaminophen 325 mg tablet; Commonly known as: Tylenol; Take 2 tablets   (650 mg) by mouth every 6 hours if needed for mild pain (1 - 3).   docusate sodium 100 mg capsule; Commonly known as: Colace; Take 1   capsule (100 mg) by mouth 2 times a day as needed for constipation (while   taking narcotic pain medication).   ibuprofen 400 mg tablet; Take 1 tablet (400 mg) by mouth every 8 hours   if needed for moderate pain (4 - 6).   oxyCODONE 5 mg immediate release tablet; Commonly known as: Roxicodone;   Take 1 tablet (5 mg) by mouth every 6  hours if needed for severe pain (7 -   10).     CONTINUE taking these medications     diclofenac sodium 1 % gel; Commonly known as: Voltaren; Apply 4.5 inches   (4 g) topically 4 times a day.   dicyclomine 20 mg tablet; Commonly known as: Bentyl; TAKE 1 TABLET (20   MG) BY MOUTH 4 TIMES A DAY AS NEEDED (CRAMPS).   esomeprazole 40 mg DR capsule; Commonly known as: NexIUM; Take 1 capsule   (40 mg) by mouth once daily in the morning. Take before meals. Do not open   capsule.   levothyroxine 150 mcg tablet; Commonly known as: Synthroid, Levoxyl;   Take 1 tablet (150 mcg) by mouth once daily.   Vitamin D3 50 mcg (2,000 units) tablet; Generic drug: cholecalciferol       Outpatient Follow-Up  Future Appointments   Date Time Provider Department Center   6/26/2025 10:50 AM Asif Hooks MD [id number] [facility]   12/2/2025  1:00 PM Margy Mondragon MD [id number] [facility]       LACIE Barker-CNP

## 2025-06-10 NOTE — PROGRESS NOTES
Pharmacy Admission Order Reconciliation Review    Srui Wray is a 75 y.o. female admitted for Hiatal hernia. Pharmacy reviewed the patient's unreconciled admission medications.    Prior to admission medications that were reviewed and acted on by the pharmacist include:  Vitamin D-3  These medications have been reconciled.     Any other unreconcilied medications have been addressed and will be ordered or held by the patient's medical team. Medications addressed by the pharmacist may be added or changed by the patient's medical team at any time.    Domenico Spencer, Mikayla  [facility]  [address]  [facility], Room[address]  [place] [id number]  Phone: [phone]  Please reach out via Secure Chat for questions, or if no response call [employer] or [facility]

## 2025-06-10 NOTE — CARE PLAN
Problem: Pain - Adult  Goal: Verbalizes/displays adequate comfort level or baseline comfort level  6/10/2025 0944 by Adela Lee RN  Outcome: Progressing  6/10/2025 0944 by Adela Lee RN  Outcome: Progressing  Flowsheets (Taken 6/10/2025 0944)  Verbalizes/displays adequate comfort level or baseline comfort level:   Assess pain using appropriate pain scale   Administer analgesics based on type and severity of pain and evaluate response   Notify Licensed Independent Practitioner if interventions unsuccessful or patient reports new pain   Consider cultural and social influences on pain and pain management     Problem: Safety - Adult  Goal: Free from fall injury  6/10/2025 0944 by Adela Lee RN  Outcome: Progressing  Flowsheets (Taken 6/10/2025 0944)  Free from fall injury:   Instruct family/caregiver on patient safety   Based on caregiver fall risk screen, instruct family/caregiver to ask for assistance with transferring infant if caregiver noted to have fall risk factors  6/10/2025 0944 by Adela Lee RN  Outcome: Progressing  Flowsheets (Taken 6/10/2025 0944)  Free from fall injury:   Instruct family/caregiver on patient safety   Based on caregiver fall risk screen, instruct family/caregiver to ask for assistance with transferring infant if caregiver noted to have fall risk factors     Problem: Discharge Planning  Goal: Discharge to home or other facility with appropriate resources  6/10/2025 0944 by Adela Lee RN  Outcome: Progressing  Flowsheets (Taken 6/10/2025 0944)  Discharge to home or other facility with appropriate resources:   Identify barriers to discharge with patient and caregiver   Identify discharge learning needs (meds, wound care, etc)   Refer to discharge planning if patient needs post-hospital services based on physician order or complex needs related to functional status, cognitive ability or social support system  6/10/2025 0944 by Adela Lee RN  Outcome: Progressing  Flowsheets  (Taken 6/10/2025 0944)  Discharge to home or other facility with appropriate resources:   Identify barriers to discharge with patient and caregiver   Identify discharge learning needs (meds, wound care, etc)   Refer to discharge planning if patient needs post-hospital services based on physician order or complex needs related to functional status, cognitive ability or social support system     Problem: Chronic Conditions and Co-morbidities  Goal: Patient's chronic conditions and co-morbidity symptoms are monitored and maintained or improved  6/10/2025 0944 by Adela Lee RN  Outcome: Progressing  Flowsheets (Taken 6/10/2025 0944)  Care Plan - Patient's Chronic Conditions and Co-Morbidity Symptoms are Monitored and Maintained or Improved:   Monitor and assess patient's chronic conditions and comorbid symptoms for stability, deterioration, or improvement   Collaborate with multidisciplinary team to address chronic and comorbid conditions and prevent exacerbation or deterioration   Update acute care plan with appropriate goals if chronic or comorbid symptoms are exacerbated and prevent overall improvement and discharge  6/10/2025 0944 by Adela Lee RN  Outcome: Progressing  Flowsheets (Taken 6/10/2025 0944)  Care Plan - Patient's Chronic Conditions and Co-Morbidity Symptoms are Monitored and Maintained or Improved:   Monitor and assess patient's chronic conditions and comorbid symptoms for stability, deterioration, or improvement   Collaborate with multidisciplinary team to address chronic and comorbid conditions and prevent exacerbation or deterioration   Update acute care plan with appropriate goals if chronic or comorbid symptoms are exacerbated and prevent overall improvement and discharge     Problem: Nutrition  Goal: Nutrient intake appropriate for maintaining nutritional needs  6/10/2025 0944 by Adela Lee RN  Outcome: Progressing  6/10/2025 0944 by Adela Lee RN  Outcome: Progressing   The patient's  goals for the shift include  pain control    The clinical goals for the shift include Maintain safety

## 2025-06-10 NOTE — DISCHARGE INSTRUCTIONS
For pain control: Take 650mg Tylenol every 4-6 hours. You may rotate with ibuprofen as needed. For severe pain, you may take oxycodone as needed as prescribed. Try to limit your use of narcotic pain medication. If taking oxycodone, please take with stool softener, such as colace, to prevent constipation.     - Continue taking your acid reflux medication, Nexium, until discussed with Dr Hooks.     - Follow up with Dr Hooks in 2 weeks.

## 2025-06-13 LAB
ATRIAL RATE: 58 BPM
P AXIS: 44 DEGREES
P OFFSET: 172 MS
P ONSET: 112 MS
PR INTERVAL: 194 MS
Q ONSET: 209 MS
QRS COUNT: 9 BEATS
QRS DURATION: 94 MS
QT INTERVAL: 482 MS
QTC CALCULATION(BAZETT): 473 MS
QTC FREDERICIA: 476 MS
R AXIS: 23 DEGREES
T AXIS: 48 DEGREES
T OFFSET: 450 MS
VENTRICULAR RATE: 58 BPM

## 2025-06-24 ENCOUNTER — CLINICAL SUPPORT (OUTPATIENT)
Dept: EMERGENCY MEDICINE | Facility: HOSPITAL | Age: 75
End: 2025-06-24
Payer: MEDICARE

## 2025-06-24 ENCOUNTER — HOSPITAL ENCOUNTER (EMERGENCY)
Facility: HOSPITAL | Age: 75
Discharge: HOME | End: 2025-06-25
Attending: EMERGENCY MEDICINE | Admitting: SURGERY
Payer: MEDICARE

## 2025-06-24 DIAGNOSIS — R13.10 DYSPHAGIA, UNSPECIFIED TYPE: Primary | ICD-10-CM

## 2025-06-24 LAB
ALBUMIN SERPL BCP-MCNC: 4.5 G/DL (ref 3.4–5)
ALP SERPL-CCNC: 116 U/L (ref 33–136)
ALT SERPL W P-5'-P-CCNC: 21 U/L (ref 7–45)
ANION GAP SERPL CALC-SCNC: 18 MMOL/L (ref 10–20)
AST SERPL W P-5'-P-CCNC: 24 U/L (ref 9–39)
ATRIAL RATE: 72 BPM
BASOPHILS # BLD AUTO: 0.06 X10*3/UL (ref 0–0.1)
BASOPHILS NFR BLD AUTO: 0.6 %
BILIRUB SERPL-MCNC: 0.6 MG/DL (ref 0–1.2)
BUN SERPL-MCNC: 12 MG/DL (ref 6–23)
CALCIUM SERPL-MCNC: 10.2 MG/DL (ref 8.6–10.6)
CARDIAC TROPONIN I PNL SERPL HS: 4 NG/L (ref 0–34)
CHLORIDE SERPL-SCNC: 106 MMOL/L (ref 98–107)
CO2 SERPL-SCNC: 21 MMOL/L (ref 21–32)
CREAT SERPL-MCNC: 0.9 MG/DL (ref 0.5–1.05)
EGFRCR SERPLBLD CKD-EPI 2021: 67 ML/MIN/1.73M*2
EOSINOPHIL # BLD AUTO: 0.62 X10*3/UL (ref 0–0.4)
EOSINOPHIL NFR BLD AUTO: 5.8 %
ERYTHROCYTE [DISTWIDTH] IN BLOOD BY AUTOMATED COUNT: 13 % (ref 11.5–14.5)
GLUCOSE SERPL-MCNC: 95 MG/DL (ref 74–99)
HCT VFR BLD AUTO: 39.1 % (ref 36–46)
HGB BLD-MCNC: 13.5 G/DL (ref 12–16)
IMM GRANULOCYTES # BLD AUTO: 0.04 X10*3/UL (ref 0–0.5)
IMM GRANULOCYTES NFR BLD AUTO: 0.4 % (ref 0–0.9)
LIPASE SERPL-CCNC: 18 U/L (ref 9–82)
LYMPHOCYTES # BLD AUTO: 3.64 X10*3/UL (ref 0.8–3)
LYMPHOCYTES NFR BLD AUTO: 34 %
MAGNESIUM SERPL-MCNC: 2.19 MG/DL (ref 1.6–2.4)
MCH RBC QN AUTO: 30.5 PG (ref 26–34)
MCHC RBC AUTO-ENTMCNC: 34.5 G/DL (ref 32–36)
MCV RBC AUTO: 89 FL (ref 80–100)
MONOCYTES # BLD AUTO: 0.61 X10*3/UL (ref 0.05–0.8)
MONOCYTES NFR BLD AUTO: 5.7 %
NEUTROPHILS # BLD AUTO: 5.74 X10*3/UL (ref 1.6–5.5)
NEUTROPHILS NFR BLD AUTO: 53.5 %
NRBC BLD-RTO: 0 /100 WBCS (ref 0–0)
P AXIS: 68 DEGREES
P OFFSET: 200 MS
P ONSET: 146 MS
PLATELET # BLD AUTO: 384 X10*3/UL (ref 150–450)
POTASSIUM SERPL-SCNC: 3.7 MMOL/L (ref 3.5–5.3)
PR INTERVAL: 156 MS
PROT SERPL-MCNC: 7.9 G/DL (ref 6.4–8.2)
Q ONSET: 224 MS
QRS COUNT: 11 BEATS
QRS DURATION: 80 MS
QT INTERVAL: 418 MS
QTC CALCULATION(BAZETT): 457 MS
QTC FREDERICIA: 444 MS
R AXIS: 56 DEGREES
RBC # BLD AUTO: 4.42 X10*6/UL (ref 4–5.2)
RBC MORPH BLD: NORMAL
SODIUM SERPL-SCNC: 141 MMOL/L (ref 136–145)
T AXIS: 32 DEGREES
T OFFSET: 433 MS
VENTRICULAR RATE: 72 BPM
WBC # BLD AUTO: 10.7 X10*3/UL (ref 4.4–11.3)

## 2025-06-24 PROCEDURE — 1210000001 HC SEMI-PRIVATE ROOM DAILY

## 2025-06-24 PROCEDURE — 83735 ASSAY OF MAGNESIUM: CPT | Performed by: EMERGENCY MEDICINE

## 2025-06-24 PROCEDURE — 85025 COMPLETE CBC W/AUTO DIFF WBC: CPT | Performed by: EMERGENCY MEDICINE

## 2025-06-24 PROCEDURE — 83690 ASSAY OF LIPASE: CPT | Performed by: EMERGENCY MEDICINE

## 2025-06-24 PROCEDURE — 84484 ASSAY OF TROPONIN QUANT: CPT | Performed by: EMERGENCY MEDICINE

## 2025-06-24 PROCEDURE — 96374 THER/PROPH/DIAG INJ IV PUSH: CPT | Mod: 59

## 2025-06-24 PROCEDURE — 96361 HYDRATE IV INFUSION ADD-ON: CPT

## 2025-06-24 PROCEDURE — 2500000004 HC RX 250 GENERAL PHARMACY W/ HCPCS (ALT 636 FOR OP/ED): Performed by: EMERGENCY MEDICINE

## 2025-06-24 PROCEDURE — 2500000004 HC RX 250 GENERAL PHARMACY W/ HCPCS (ALT 636 FOR OP/ED)

## 2025-06-24 PROCEDURE — 36415 COLL VENOUS BLD VENIPUNCTURE: CPT | Performed by: EMERGENCY MEDICINE

## 2025-06-24 PROCEDURE — 99285 EMERGENCY DEPT VISIT HI MDM: CPT | Mod: 25 | Performed by: EMERGENCY MEDICINE

## 2025-06-24 PROCEDURE — 80053 COMPREHEN METABOLIC PANEL: CPT | Performed by: EMERGENCY MEDICINE

## 2025-06-24 PROCEDURE — 93005 ELECTROCARDIOGRAM TRACING: CPT

## 2025-06-24 PROCEDURE — 96375 TX/PRO/DX INJ NEW DRUG ADDON: CPT

## 2025-06-24 RX ORDER — METHOCARBAMOL 100 MG/ML
1000 INJECTION, SOLUTION INTRAMUSCULAR; INTRAVENOUS EVERY 8 HOURS
Status: DISCONTINUED | OUTPATIENT
Start: 2025-06-24 | End: 2025-06-25 | Stop reason: HOSPADM

## 2025-06-24 RX ORDER — SODIUM CHLORIDE, SODIUM LACTATE, POTASSIUM CHLORIDE, CALCIUM CHLORIDE 600; 310; 30; 20 MG/100ML; MG/100ML; MG/100ML; MG/100ML
125 INJECTION, SOLUTION INTRAVENOUS CONTINUOUS
Status: DISCONTINUED | OUTPATIENT
Start: 2025-06-24 | End: 2025-06-25

## 2025-06-24 RX ORDER — PANTOPRAZOLE SODIUM 40 MG/10ML
40 INJECTION, POWDER, LYOPHILIZED, FOR SOLUTION INTRAVENOUS DAILY
Status: DISCONTINUED | OUTPATIENT
Start: 2025-06-24 | End: 2025-06-25 | Stop reason: HOSPADM

## 2025-06-24 RX ADMIN — PANTOPRAZOLE SODIUM 40 MG: 40 INJECTION, POWDER, LYOPHILIZED, FOR SOLUTION INTRAVENOUS at 16:50

## 2025-06-24 RX ADMIN — SODIUM CHLORIDE, SODIUM LACTATE, POTASSIUM CHLORIDE, AND CALCIUM CHLORIDE 125 ML/HR: .6; .31; .03; .02 INJECTION, SOLUTION INTRAVENOUS at 14:45

## 2025-06-24 RX ADMIN — SODIUM CHLORIDE, SODIUM LACTATE, POTASSIUM CHLORIDE, AND CALCIUM CHLORIDE 125 ML/HR: .6; .31; .03; .02 INJECTION, SOLUTION INTRAVENOUS at 21:18

## 2025-06-24 RX ADMIN — METHOCARBAMOL 1000 MG: 100 INJECTION INTRAMUSCULAR; INTRAVENOUS at 16:50

## 2025-06-24 ASSESSMENT — LIFESTYLE VARIABLES
TOTAL SCORE: 0
HAVE YOU EVER FELT YOU SHOULD CUT DOWN ON YOUR DRINKING: NO
EVER FELT BAD OR GUILTY ABOUT YOUR DRINKING: NO
HAVE PEOPLE ANNOYED YOU BY CRITICIZING YOUR DRINKING: NO
EVER HAD A DRINK FIRST THING IN THE MORNING TO STEADY YOUR NERVES TO GET RID OF A HANGOVER: NO

## 2025-06-24 ASSESSMENT — PAIN DESCRIPTION - LOCATION: LOCATION: CHEST

## 2025-06-24 ASSESSMENT — PAIN DESCRIPTION - PAIN TYPE: TYPE: ACUTE PAIN

## 2025-06-24 ASSESSMENT — PAIN SCALES - GENERAL
PAINLEVEL_OUTOF10: 4
PAINLEVEL_OUTOF10: 5 - MODERATE PAIN

## 2025-06-24 ASSESSMENT — PAIN - FUNCTIONAL ASSESSMENT: PAIN_FUNCTIONAL_ASSESSMENT: 0-10

## 2025-06-24 ASSESSMENT — PAIN DESCRIPTION - DESCRIPTORS
DESCRIPTORS: BURNING;PRESSURE
DESCRIPTORS: PRESSURE

## 2025-06-24 NOTE — ED TRIAGE NOTES
Patient to ED with c/o CP x last night. Dr. Grider did hernia repair on 6/9 and was instructed to come in to rule out obstruction. Patient states pain began last night in middle of chest. Patient last meal 1900 last night. Patient unable to drink water. Pressure in chest. Patient tearful in triage. Los 20 lbs since surgery.

## 2025-06-24 NOTE — H&P
History Of Present Illness  Suri Wray is a 75 y.o. female with a past medical history of BPPV, HTN, and paraesophageal hernia s/p recent uncomplicated laparoscopic paraesophageal hernia repair with Dr. Hooks on 6/9 who presents to the ED with 6 days of worsening odynophagia and dysphagia.     Patient was seen in ED, accompanied by her . Per patient, she was doing well after her surgery until about 6 days ago when she advanced her diet from clears to fulls (6-8 days postop). She felt food kept getting stuck in her chest and is associated with a burning pain. Because of this, she returned to a clear liquid diet, however the symptoms persisted. Over the last few days, she has had increased small spit ups and phlegm production. She has not eaten or drank anything today due to the fear of abdominal pain. Patient states that she attempted to take her small thyroid medications, but was unable to swallow them. She continues to have bowel function. Denies nausea. Denies shortness of breath, chest pain, diaphoresis, palpitations, or arm numbness. She currently does not have any pain.      Past Medical History  Medical History[1]    Surgical History  Surgical History[2]     Social History  She reports that she has never smoked. She has never used smokeless tobacco. She reports current alcohol use. She reports that she does not use drugs.    Family History  Family History[3]     Allergies  Codeine    Review of Systems  ROS: 12-point review of system performed and is negative except as stated in HPI.     Physical Exam  Physical Exam  General: awake, alert, no acute distress  HEENT: atraumatic, normocephalic  CV: regular rate, hypertensive  Pulm: non-labored breathing on room air  GI: abdomen soft, non-distended, mild tenderness to palpation mid epigastric area. Port site incisions healing well.   : deferred  Skin: warm, dry  Extremities: no peripheral edema    Last Recorded Vitals  Blood pressure (!) 201/99,  pulse 80, temperature 36 °C (96.8 °F), temperature source Temporal, resp. rate 18, height 1.524 m (5'), weight 70.3 kg (155 lb), SpO2 100%.    Relevant Results  Results for orders placed or performed during the hospital encounter of 06/24/25 (from the past 24 hours)   CBC with Differential   Result Value Ref Range    WBC 10.7 4.4 - 11.3 x10*3/uL    nRBC 0.0 0.0 - 0.0 /100 WBCs    RBC 4.42 4.00 - 5.20 x10*6/uL    Hemoglobin 13.5 12.0 - 16.0 g/dL    Hematocrit 39.1 36.0 - 46.0 %    MCV 89 80 - 100 fL    MCH 30.5 26.0 - 34.0 pg    MCHC 34.5 32.0 - 36.0 g/dL    RDW 13.0 11.5 - 14.5 %    Platelets 384 150 - 450 x10*3/uL    Neutrophils % 53.5 40.0 - 80.0 %    Immature Granulocytes %, Automated 0.4 0.0 - 0.9 %    Lymphocytes % 34.0 13.0 - 44.0 %    Monocytes % 5.7 2.0 - 10.0 %    Eosinophils % 5.8 0.0 - 6.0 %    Basophils % 0.6 0.0 - 2.0 %    Neutrophils Absolute 5.74 (H) 1.60 - 5.50 x10*3/uL    Immature Granulocytes Absolute, Automated 0.04 0.00 - 0.50 x10*3/uL    Lymphocytes Absolute 3.64 (H) 0.80 - 3.00 x10*3/uL    Monocytes Absolute 0.61 0.05 - 0.80 x10*3/uL    Eosinophils Absolute 0.62 (H) 0.00 - 0.40 x10*3/uL    Basophils Absolute 0.06 0.00 - 0.10 x10*3/uL   Comprehensive Metabolic Panel   Result Value Ref Range    Glucose 95 74 - 99 mg/dL    Sodium 141 136 - 145 mmol/L    Potassium 3.7 3.5 - 5.3 mmol/L    Chloride 106 98 - 107 mmol/L    Bicarbonate 21 21 - 32 mmol/L    Anion Gap 18 10 - 20 mmol/L    Urea Nitrogen 12 6 - 23 mg/dL    Creatinine 0.90 0.50 - 1.05 mg/dL    eGFR 67 >60 mL/min/1.73m*2    Calcium 10.2 8.6 - 10.6 mg/dL    Albumin 4.5 3.4 - 5.0 g/dL    Alkaline Phosphatase 116 33 - 136 U/L    Total Protein 7.9 6.4 - 8.2 g/dL    AST 24 9 - 39 U/L    Bilirubin, Total 0.6 0.0 - 1.2 mg/dL    ALT 21 7 - 45 U/L   Lipase   Result Value Ref Range    Lipase 18 9 - 82 U/L   Troponin I, High Sensitivity   Result Value Ref Range    Troponin I, High Sensitivity (CMC) 4 0 - 34 ng/L   Magnesium   Result Value Ref Range     Magnesium 2.19 1.60 - 2.40 mg/dL   Morphology   Result Value Ref Range    RBC Morphology No significant RBC morphology present              Assessment & Plan  Dysphagia, unspecified type    75 y.o. female with a past medical history of BPPV, HTN, and paraesophageal hernia s/p recent uncomplicated laparoscopic paraesophageal hernia repair with Dr. Hooks on 6/9 who presents to the ED with 6 days of worsening odynophagia and dysphagia.     Plan:  - admit to [facility]  - UGI Swallow Study  - Limited clear liquid diet as tolerated  - IV PPI  - Further plans pending swallow study results    D/w fellow Dr. Qasim Meyer and attending Dr. Neva Munoz MD  PGY-2  [facility]/B[facility]ric [facility]  [id number]         [1]   Past Medical History:  Diagnosis Date    Abdominal pain, RUQ (right upper quadrant) 04/27/2023    Acute upper respiratory infection, unspecified 09/09/2016    URTI (acute upper respiratory infection)    Fever, unspecified 04/01/2019    Elevated temperature    Other conditions influencing health status 10/23/2013    Sprain Of Left Side Of Back    Other conditions influencing health status 09/13/2016    History of cough    Other conditions influencing health status 06/29/2017    Body mass index (BMI) of 30.0 to 39.9    Personal history of other diseases of the respiratory system 09/13/2016    History of acute sinusitis    Personal history of other diseases of the respiratory system 07/07/2018    History of acute sinusitis    Personal history of other diseases of the respiratory system 09/29/2014    Personal history of acute sinusitis    Personal history of other specified conditions     History of diarrhea    Personal history of other specified conditions 04/14/2018    History of diarrhea    Trochanteric bursitis, left hip 06/29/2017    Greater trochanteric bursitis, left   [2]   Past Surgical History:  Procedure Laterality Date    COLONOSCOPY  09/04/2015    Complete Colonoscopy    OTHER SURGICAL  HISTORY  09/04/2015    Cholecystotomy   [3] No family history on file.

## 2025-06-24 NOTE — PROGRESS NOTES
Pharmacy Medication History Review    Suri Wray is a 75 y.o. female admitted for Dysphagia, unspecified type. Pharmacy reviewed the patient's [id number] medications and allergies for accuracy.    Medications ADDED:  N/A  Medications CHANGED:  N/A  Medications REMOVED:   Vitamin D3 50 mcg     The list below reflects the updated PTA list.   Prior to Admission Medications   Prescriptions Last Dose Informant   acetaminophen (Tylenol) 325 mg tablet Not Taking Self   Sig: Take 2 tablets (650 mg) by mouth every 6 hours if needed for mild pain (1 - 3).   Patient not taking: Reported on 6/24/2025   diclofenac sodium (Voltaren) 1 % gel Not Taking Self   Sig: Apply 4.5 inches (4 g) topically 4 times a day.   Patient not taking: Reported on 6/24/2025   dicyclomine (Bentyl) 20 mg tablet Not Taking Self   Sig: TAKE 1 TABLET (20 MG) BY MOUTH 4 TIMES A DAY AS NEEDED (CRAMPS).   Patient not taking: Reported on 6/24/2025   docusate sodium (Colace) 100 mg capsule Not Taking Self   Sig: Take 1 capsule (100 mg) by mouth 2 times a day as needed for constipation (while taking narcotic pain medication).   Patient not taking: Reported on 6/24/2025   esomeprazole (NexIUM) 40 mg DR capsule 6/24/2025 Self   Sig: Take 1 capsule (40 mg) by mouth once daily in the morning. Take before meals. Do not open capsule.   ibuprofen 400 mg tablet Not Taking Self   Sig: Take 1 tablet (400 mg) by mouth every 8 hours if needed for moderate pain (4 - 6).   Patient not taking: Reported on 6/24/2025   levothyroxine (Synthroid, Levoxyl) 150 mcg tablet 6/23/2025 Self   Sig: Take 1 tablet (150 mcg) by mouth once daily.   oxyCODONE (Roxicodone) 5 mg immediate release tablet Not Taking Self   Sig: Take 1 tablet (5 mg) by mouth every 6 hours if needed for severe pain (7 - 10).   Patient not taking: Reported on 6/24/2025      Facility-Administered Medications: None         The list below reflects the updated allergy list. Please review each documented  "allergy for additional clarification and justification.  Allergies  Reviewed by Pascual Chen on 6/24/2025        Severity Reactions Comments    Codeine Not Specified Itching             Patient declines M2B at discharge.     Sources:   [facility]  Pharmacy dispense history  Patient Interview Good historian  Chart Review  Care Everywhere     Additional Comments:  N/A      PASCUAL CHEN  Pharmacy Technician  06/24/25     Secure Chat preferred   If no response call [phone] or [place] \"Med Rec\"   "

## 2025-06-24 NOTE — ED PROVIDER NOTES
Emergency Department Provider Note        History of Present Illness     History provided by: Patient  Limitations to History: None  External Records Reviewed with Brief Summary: Discharge Summary from  which showed patient admitted for lap paraesophageal hernia repair  which was uncomplicated     HPI:  Suri Wray is a 75 y.o. female PMH of gastritis, BPPV, HTN recent paraesophageal hernia repair who presents to the ED for dysphagia. Patient states that she did well post-operatively with a clear liquid diet that was advanced to full liquid diet on day 6. Patient states after this she started to have progressive dysphagia. Patient states she was told to revert to a clear liquid diet which she has done for the most part but symptoms have continued to progress. She states this morning she was unable to swallow her small thyroid pill. She endorses phlegm production last evening and states she was up for most of the night spitting into a napkin. Patient endorses still having normal bowel movement. Patient endorses tenderness to palpation of the chest wall    Physical Exam   Triage vitals:  T 36 °C (96.8 °F)  HR 80  BP (!) 201/99  RR 18  O2 100 % None (Room air)    General: Awake, alert, in no acute distress  Eyes: Gaze conjugate.  No scleral icterus or injection  HENT: Normo-cephalic, atraumatic. No stridor  CV: Regular rate, regular rhythm. Radial pulses 2+ bilaterally  Resp: Breathing non-labored, speaking in full sentences.  Clear to auscultation bilaterally  GI: Soft, non-distended, non-tender. No rebound or guarding.  Surgical incisions covered with steri-strips are clean and intact.   MSK/Extremities: No gross bony deformities. Moving all extremities  Skin: Warm. Appropriate color  Neuro: Alert. Oriented. Face symmetric. Speech is fluent.  Gross strength and sensation intact in b/l UE and LEs  Psych: Appropriate mood and affect    Medical Decision Making & ED Course   Medical Decision Makin  y.o. female pmh of recent paraesophageal hernia repair 6/09 presents to the emergency department for progressive dysphagia after advancing her diet which has not improved after transitioning back to clear liquid diet. On arrival patient is hypertensive, otherwise hemodynamically stable and well appearing. She describes a painful sensation with swallowing and now inability to swallow pills consistent with dysphagia. Labs obtained in triage are unremarkable, no electrolyte abnormalities, RAMAN, anemia or leukocytosis. Troponin and EKG also obtained and are reassuring.  ACS available to evaluate the patient, will admit for swallow study and further management. Started on IVF given inability to tolerate a significant amount of PO intake. Admitted to [facility] in stable condition.      Social Determinants of Health which Significantly Impact Care: None identified     EKG Independent Interpretation: EKG interpreted by myself. Please see ED Course for full interpretation.    Independent Result Review and Interpretation: Relevant laboratory and radiographic results were reviewed and independently interpreted by myself.  As necessary, they are commented on in the ED Course.    Chronic conditions affecting the patient's care: As documented above in [facility]    The patient was discussed with the following consultants/services: ACS    Care Considerations: As documented above in [facility]    ED Course:  ED Course as of 06/24/25 1457   Tue Jun 24, 2025   1456 ECG 12 lead  EKG demonstrated NSR, no ST elevations/depression or TWI. No concerns for ischemia. No significant changes compared to prior June 9, 2025 [AW]      ED Course User Index  [AW] Rachele Estrella DO         Diagnoses as of 06/24/25 1457   Dysphagia, unspecified type     Disposition   As a result of their workup, the patient will require admission to the hospital.  The patient was informed of her diagnosis.  The patient was given the opportunity to ask questions and I  answered them. The patient agreed to be admitted to the hospital.    Procedures   Procedures    Patient seen and discussed with ED attending physician.    Rachele Estrella DO  Emergency Medicine       Rachele Estrella DO  Resident  06/24/25 1549

## 2025-06-25 ENCOUNTER — APPOINTMENT (OUTPATIENT)
Dept: RADIOLOGY | Facility: HOSPITAL | Age: 75
End: 2025-06-25
Payer: MEDICARE

## 2025-06-25 VITALS
OXYGEN SATURATION: 99 % | HEART RATE: 68 BPM | TEMPERATURE: 97.9 F | BODY MASS INDEX: 30.43 KG/M2 | WEIGHT: 155 LBS | SYSTOLIC BLOOD PRESSURE: 130 MMHG | RESPIRATION RATE: 16 BRPM | HEIGHT: 60 IN | DIASTOLIC BLOOD PRESSURE: 78 MMHG

## 2025-06-25 PROCEDURE — 96374 THER/PROPH/DIAG INJ IV PUSH: CPT | Mod: 59

## 2025-06-25 PROCEDURE — 96375 TX/PRO/DX INJ NEW DRUG ADDON: CPT

## 2025-06-25 PROCEDURE — 96376 TX/PRO/DX INJ SAME DRUG ADON: CPT

## 2025-06-25 PROCEDURE — 99024 POSTOP FOLLOW-UP VISIT: CPT | Performed by: NURSE PRACTITIONER

## 2025-06-25 PROCEDURE — 74220 X-RAY XM ESOPHAGUS 1CNTRST: CPT

## 2025-06-25 PROCEDURE — 96361 HYDRATE IV INFUSION ADD-ON: CPT

## 2025-06-25 PROCEDURE — 2550000001 HC RX 255 CONTRASTS: Performed by: SURGERY

## 2025-06-25 PROCEDURE — 74220 X-RAY XM ESOPHAGUS 1CNTRST: CPT | Performed by: RADIOLOGY

## 2025-06-25 PROCEDURE — 2500000004 HC RX 250 GENERAL PHARMACY W/ HCPCS (ALT 636 FOR OP/ED)

## 2025-06-25 PROCEDURE — 36415 COLL VENOUS BLD VENIPUNCTURE: CPT

## 2025-06-25 RX ORDER — DIATRIZOATE MEGLUMINE AND DIATRIZOATE SODIUM 660; 100 MG/ML; MG/ML
20 SOLUTION ORAL; RECTAL ONCE
Status: COMPLETED | OUTPATIENT
Start: 2025-06-25 | End: 2025-06-25

## 2025-06-25 RX ADMIN — METHOCARBAMOL 1000 MG: 100 INJECTION INTRAMUSCULAR; INTRAVENOUS at 11:41

## 2025-06-25 RX ADMIN — METHOCARBAMOL 1000 MG: 100 INJECTION INTRAMUSCULAR; INTRAVENOUS at 03:09

## 2025-06-25 RX ADMIN — DIATRIZOATE MEGLUMINE AND DIATRIZOATE SODIUM 20 ML: 660; 100 LIQUID ORAL; RECTAL at 11:07

## 2025-06-25 RX ADMIN — PANTOPRAZOLE SODIUM 40 MG: 40 INJECTION, POWDER, LYOPHILIZED, FOR SOLUTION INTRAVENOUS at 08:03

## 2025-06-25 NOTE — PROGRESS NOTES
Suri Wray is a 75 y.o. female on day 1 of admission presenting with Dysphagia, unspecified type.    Subjective   No acute events overnight. Still has not tried much PO intake due to fear of dysphagia.        Objective     Physical Exam  General: awake, alert, no acute distress  HEENT: atraumatic, normocephalic  CV: regular rate, hypertensive  Pulm: non-labored breathing on room air  GI: abdomen soft, non-distended, non-tender to palpation. Port site incisions healing well.   : deferred  Skin: warm, dry  Extremities: no peripheral edema    Last Recorded Vitals  Blood pressure 152/80, pulse 63, temperature 36.3 °C (97.3 °F), temperature source Oral, resp. rate 18, height 1.524 m (5'), weight 70.3 kg (155 lb), SpO2 98%.    Intake/Output last 3 Shifts:  No intake/output data recorded.    Relevant Results  No new labs from 6/25    Assessment & Plan  Dysphagia, unspecified type    75 y.o. female with a past medical history of BPPV, HTN, and paraesophageal hernia s/p recent uncomplicated laparoscopic paraesophageal hernia repair with Dr. Hooks on 6/9 who presents to the ED with 6 days of worsening odynophagia and dysphagia.      Plan:  - admit to [facility] (bed pending)  - vital signs q4h  - Fl GI Esophogram (Swallow Study) today 6/25  - Limited clear liquid diet as tolerated  - IV PPI daily  - mIVF LR @125  - holding home PO meds (synthroid, PPI) for now   - Further plans pending swallow study results    D/w Dr. Neva Munoz MD  PGY-2  [facility]  [id number]

## 2025-06-25 NOTE — DISCHARGE SUMMARY
Discharge Diagnosis  Dysphagia, unspecified type    Issues Requiring Follow-Up  - Outpatient surgical follow up     Test Results Pending At Discharge  Pending Labs       No current pending labs.            Hospital Course  75 year old female with recent laparoscopic paraesophageal hernia repair on 6/9 presented to ED on 6/24 with complaints of dysphagia. She was admitted to general surgery service and completed an esophagram on 6/25 without evidence of leak or obstruction. Small amount of delayed contrast at GEJ was noted that promptly passed into stomach. Lab work was unremarkable. She was able to tolerate a liquid diet after esophagram without difficulty and was requesting to discharge home. She was discharged home with close outpatient surgical follow up scheduled.     Visit Vitals  /78   Pulse 68   Temp 36.6 °C (97.9 °F) (Temporal)   Resp 16     Vitals:    06/24/25 1229   Weight: 70.3 kg (155 lb)       Immunization History   Administered Date(s) Administered    Flu vaccine (IIV4), preservative free *Check age/dose* 11/03/2014    Flu vaccine, quadrivalent, high-dose, preservative free, age 65y+ (FLUZONE) 09/25/2020, 10/24/2022    Flu vaccine, trivalent, preservative free, HIGH-DOSE, age 65y+ (Fluzone) 10/25/2017, 10/11/2018, 10/21/2021, 10/24/2022, 10/06/2024    Influenza, Seasonal, Quadrivalent, Adjuvanted 10/21/2021    Influenza, Unspecified 09/11/2009    Influenza, injectable, quadrivalent 12/12/2023    Influenza, seasonal, injectable 09/27/2011, 09/13/2012, 10/22/2013    Moderna COVID-19 vaccine, bivalent, blue cap/gray label *Check age/dose* 09/16/2022    Moderna SARS-CoV-2 Vaccination 03/04/2021, 04/01/2021, 11/05/2021    Novel influenza-H1N1-09, preservative-free 01/02/2010    Pneumococcal polysaccharide vaccine, 23-valent, age 2 years and older (PNEUMOVAX 23) 02/28/2022    Tdap vaccine, age 7 year and older (BOOSTRIX, ADACEL) 07/22/2008    Zoster, live 12/04/2014     Pertinent Physical Exam At Time  of Discharge  Physical Exam  Constitutional:       General: She is not in acute distress.  Cardiovascular:      Rate and Rhythm: Normal rate and regular rhythm.   Pulmonary:      Effort: Pulmonary effort is normal. No respiratory distress.      Comments: RA  Abdominal:      General: There is no distension.      Palpations: Abdomen is soft.      Tenderness: There is no abdominal tenderness.   Skin:     General: Skin is warm and dry.   Neurological:      Mental Status: She is alert and oriented to person, place, and time.       Home Medications     Medication List      CONTINUE taking these medications     acetaminophen 325 mg tablet; Commonly known as: Tylenol; Take 2 tablets   (650 mg) by mouth every 6 hours if needed for mild pain (1 - 3).   docusate sodium 100 mg capsule; Commonly known as: Colace; Take 1   capsule (100 mg) by mouth 2 times a day as needed for constipation (while   taking narcotic pain medication).   esomeprazole 40 mg DR capsule; Commonly known as: NexIUM; Take 1 capsule   (40 mg) by mouth once daily in the morning. Take before meals. Do not open   capsule.   ibuprofen 400 mg tablet; Take 1 tablet (400 mg) by mouth every 8 hours   if needed for moderate pain (4 - 6).   levothyroxine 150 mcg tablet; Commonly known as: Synthroid, Levoxyl;   Take 1 tablet (150 mcg) by mouth once daily.   oxyCODONE 5 mg immediate release tablet; Commonly known as: Roxicodone;   Take 1 tablet (5 mg) by mouth every 6 hours if needed for severe pain (7 -   10).     ASK your doctor about these medications     diclofenac sodium 1 % gel; Commonly known as: Voltaren; Apply 4.5 inches   (4 g) topically 4 times a day.   dicyclomine 20 mg tablet; Commonly known as: Bentyl; TAKE 1 TABLET (20   MG) BY MOUTH 4 TIMES A DAY AS NEEDED (CRAMPS).       Outpatient Follow-Up  Future Appointments   Date Time Provider Department [place]   6/26/2025 10:50 AM Asif Hooks MD [id number] [facility]   12/2/2025  1:00 PM Margy Mondragon MD  [id number] [facility]       Lucia Arzola, APRN-CNP

## 2025-06-26 ENCOUNTER — APPOINTMENT (OUTPATIENT)
Dept: SURGERY | Facility: CLINIC | Age: 75
End: 2025-06-26
Payer: MEDICARE

## 2025-06-26 VITALS
BODY MASS INDEX: 30.47 KG/M2 | WEIGHT: 156 LBS | SYSTOLIC BLOOD PRESSURE: 114 MMHG | DIASTOLIC BLOOD PRESSURE: 71 MMHG | HEART RATE: 70 BPM

## 2025-06-26 DIAGNOSIS — K44.9 HIATAL HERNIA: Primary | ICD-10-CM

## 2025-06-26 PROCEDURE — 3074F SYST BP LT 130 MM HG: CPT | Performed by: SURGERY

## 2025-06-26 PROCEDURE — 3078F DIAST BP <80 MM HG: CPT | Performed by: SURGERY

## 2025-06-26 PROCEDURE — 1159F MED LIST DOCD IN RCRD: CPT | Performed by: SURGERY

## 2025-06-26 PROCEDURE — 99024 POSTOP FOLLOW-UP VISIT: CPT | Performed by: SURGERY

## 2025-06-26 PROCEDURE — 1160F RVW MEDS BY RX/DR IN RCRD: CPT | Performed by: SURGERY

## 2025-06-26 PROCEDURE — 1036F TOBACCO NON-USER: CPT | Performed by: SURGERY

## 2025-06-26 NOTE — PROGRESS NOTES
History Of Present Illness  Suri Wray is a 75 y.o. female presenting status post paraesophageal hernia repair.  She was having some significant dysphagia 2 days ago and I had her go to the emergency room.  Her labs were all normal.  A barium swallow showed a with well repaired large hiatal hernia.  Did show some slight hang up of contrast at the repair.  I did not do an antireflux operation on her.  This is some swelling at the hiatal hernia repair.  She did better in the emergency room tolerating liquids and was discharged home.  He has been taking liquids since she went home yesterday.      Last Recorded Vitals  Blood pressure 114/71, pulse 70, weight 70.8 kg (156 lb).  Physical Exam on examination today her incisions are healing well.  I remove the Steri-Strips      Assessment/Plan   Patient with dysphagia post large paraesophageal hernia repair.  We did not do an antireflux operation.  I discussed with her this should resolve over the next 2 to 3 weeks.  If she continues to have problems she would do an upper endoscopy with a dilatation.  But usually this is expected in the early postoperative time.  She will follow-up with me in 3 weeks.  She will look to call me if she has any other problems.    Asif Hooks MD FACS  Professor of Surgery  BerthaEssence Wen Chair in Surgical [place]  [facility] [employer]  [address] [place] [address], [id number]  Phone [phone]  email: [email or web address]

## 2025-07-24 ENCOUNTER — APPOINTMENT (OUTPATIENT)
Dept: SURGERY | Facility: CLINIC | Age: 75
End: 2025-07-24
Payer: MEDICARE

## 2025-07-24 VITALS
SYSTOLIC BLOOD PRESSURE: 159 MMHG | TEMPERATURE: 97.7 F | HEART RATE: 70 BPM | BODY MASS INDEX: 30 KG/M2 | WEIGHT: 153.6 LBS | DIASTOLIC BLOOD PRESSURE: 89 MMHG

## 2025-07-24 DIAGNOSIS — K44.9 HIATAL HERNIA: Primary | ICD-10-CM

## 2025-07-24 PROCEDURE — 3079F DIAST BP 80-89 MM HG: CPT | Performed by: SURGERY

## 2025-07-24 PROCEDURE — 1159F MED LIST DOCD IN RCRD: CPT | Performed by: SURGERY

## 2025-07-24 PROCEDURE — 99024 POSTOP FOLLOW-UP VISIT: CPT | Performed by: SURGERY

## 2025-07-24 PROCEDURE — 1160F RVW MEDS BY RX/DR IN RCRD: CPT | Performed by: SURGERY

## 2025-07-24 PROCEDURE — 3077F SYST BP >= 140 MM HG: CPT | Performed by: SURGERY

## 2025-07-24 PROCEDURE — 1036F TOBACCO NON-USER: CPT | Performed by: SURGERY

## 2025-07-24 PROCEDURE — 1125F AMNT PAIN NOTED PAIN PRSNT: CPT | Performed by: SURGERY

## 2025-07-24 ASSESSMENT — PAIN SCALES - GENERAL: PAINLEVEL_OUTOF10: 2

## 2025-07-24 NOTE — PROGRESS NOTES
History Of Present Illness  Suri Wray is a 75 y.o. female presenting status post laparoscopic repair of a large paraesophageal hernia.  She did have some dysphagia initially after surgery.  She had a feeling where the food got stuck.  A barium swallow showed no significant abnormalities just a slight delay at the hiatus.  Her swallowing is gotten better.  She is slowly expanding her diet.  Still losing some weight.  But is stabilizing.      Last Recorded Vitals  Blood pressure 159/89, pulse 70, temperature 36.5 °C (97.7 °F), weight 69.7 kg (153 lb 9.6 oz).  Physical Exam incisions well-healed.      Assessment/Plan   He is slowly progressing from her laparoscopic large paraesophageal hernia repair.  She can resume all of her activities.  I discussed to continue to expand her diet.  If she is still having some dysphagia next 2 to 3 months we may do an upper endoscopy with a dilatation but I do not think this will be necessary since she is continuing to get better.    Asif Hooks MD FACS  Professor of Surgery  Chantel Wen Chair in Surgical [place]  [facility] [employer]  [id number] [place] [address], [id number]  Phone [phone]  email: [email or web address]

## 2025-12-02 ENCOUNTER — APPOINTMENT (OUTPATIENT)
Dept: PRIMARY CARE | Facility: CLINIC | Age: 75
End: 2025-12-02
Payer: MEDICARE

## (undated) DEVICE — SUTURE, VICRYL, 4-0, 18 IN, PS2, UNDYED

## (undated) DEVICE — COVER, CART, 45 X 27 X 48 IN, CLEAR

## (undated) DEVICE — PUMP, STRYKERFLOW 2 & HANDPIECE W/10FT. IRRIGATION TUBING

## (undated) DEVICE — DEVICE, SUTURE, ENDOCLOSE, TROCAR

## (undated) DEVICE — DRESSING, GAUZE, 16 PLY, 4 X 4 IN, STERILE

## (undated) DEVICE — CANNULA, DILATOR, W/RADICALLY EXPANDABLE SLEEVE, VERSASTEP PLUS, 12 MM

## (undated) DEVICE — Device

## (undated) DEVICE — DRAPE, LEGGINGS, 48 X 31 IN, STERILE, LF

## (undated) DEVICE — TOWEL, SURGICAL, NEURO, O/R, 16 X 26, BLUE, STERILE

## (undated) DEVICE — COVER, TABLE, 44 X 75 IN, DISPOSABLE, LF, STERILE

## (undated) DEVICE — REST, HEAD, BAGEL, 9 IN

## (undated) DEVICE — PAD, GROUNDING, ELECTROSURGICAL, W/9 FT CABLE, POLYHESIVE II, ADULT, LF

## (undated) DEVICE — STRIP, SKIN CLOSURE, STERI STRIP, REINFORCED, 0.5 X 4 IN

## (undated) DEVICE — TIP, ENDOLUMINA, DETACHABLE, 56 FR

## (undated) DEVICE — SUTURE, ETHIBOND, XTRA, 30 IN, 0, CT-2, GREEN

## (undated) DEVICE — SUTURE, VICRYL, 0, 27 IN, UR-6, VIOLET

## (undated) DEVICE — APPLICATOR, CHLORAPREP, W/ORANGE TINT, 26ML

## (undated) DEVICE — SCISSORS, EPIX, LAPOROSCOPIC, 5MM X 35CM

## (undated) DEVICE — MANIFOLD, 4 PORT NEPTUNE STANDARD

## (undated) DEVICE — ENDO, PORT VERSASTEP 5MM

## (undated) DEVICE — NEEDLE, INSUFFLATION, 14 G, 100 MM

## (undated) DEVICE — TROCAR, BLUNTPORT,  W/THREADED ANCHOR, 12MM, LF

## (undated) DEVICE — SHEARS, HARMONIC 5 X 36 CVD ACE+7

## (undated) DEVICE — TUBE SET, PNEUMOCLEAR, SMOKE EVACU, HIGH-FLOW